# Patient Record
Sex: FEMALE | Race: WHITE | NOT HISPANIC OR LATINO | URBAN - METROPOLITAN AREA
[De-identification: names, ages, dates, MRNs, and addresses within clinical notes are randomized per-mention and may not be internally consistent; named-entity substitution may affect disease eponyms.]

---

## 2024-02-22 ENCOUNTER — TELEPHONE (OUTPATIENT)
Dept: PRIMARY CARE | Facility: CLINIC | Age: 54
End: 2024-02-22
Payer: COMMERCIAL

## 2024-02-22 DIAGNOSIS — E66.01 MORBID OBESITY WITH BMI OF 40.0-44.9, ADULT (MULTI): ICD-10-CM

## 2024-02-22 DIAGNOSIS — Z00.00 ENCOUNTER FOR HEALTH MAINTENANCE EXAMINATION IN ADULT: Primary | ICD-10-CM

## 2024-02-22 NOTE — TELEPHONE ENCOUNTER
PATIENT IS COMING IN 3.27.24 FOR PHYSICAL WOULD LIKE LABS IN THOSE LABS SHE WOULD LIKE ALSO THYROID CHECKED AND HORMONE  LEVELS CHECKED

## 2024-02-23 NOTE — TELEPHONE ENCOUNTER
Fasting labs ordered including thyroid test.  I do not routinely check other hormone levels. If she is thinking about female hormone, she can talk to her gyn provider. Thanks

## 2024-02-26 ENCOUNTER — TELEPHONE (OUTPATIENT)
Dept: OBSTETRICS AND GYNECOLOGY | Facility: CLINIC | Age: 54
End: 2024-02-26

## 2024-02-26 ENCOUNTER — HOSPITAL ENCOUNTER (OUTPATIENT)
Dept: RADIOLOGY | Facility: CLINIC | Age: 54
Discharge: HOME | End: 2024-02-26
Payer: COMMERCIAL

## 2024-02-26 ENCOUNTER — LAB (OUTPATIENT)
Dept: LAB | Facility: LAB | Age: 54
End: 2024-02-26
Payer: COMMERCIAL

## 2024-02-26 DIAGNOSIS — D25.1 INTRAMURAL AND SUBMUCOUS LEIOMYOMA OF UTERUS: ICD-10-CM

## 2024-02-26 DIAGNOSIS — Z00.00 ENCOUNTER FOR HEALTH MAINTENANCE EXAMINATION IN ADULT: ICD-10-CM

## 2024-02-26 DIAGNOSIS — Z12.31 SCREENING MAMMOGRAM FOR BREAST CANCER: ICD-10-CM

## 2024-02-26 DIAGNOSIS — N95.1 PERIMENOPAUSE: Primary | ICD-10-CM

## 2024-02-26 DIAGNOSIS — E66.01 MORBID OBESITY WITH BMI OF 40.0-44.9, ADULT (MULTI): ICD-10-CM

## 2024-02-26 DIAGNOSIS — D25.0 INTRAMURAL AND SUBMUCOUS LEIOMYOMA OF UTERUS: ICD-10-CM

## 2024-02-26 PROBLEM — B34.9 VIRAL INFECTION: Status: ACTIVE | Noted: 2024-02-26

## 2024-02-26 PROBLEM — J32.9 SINUSITIS: Status: ACTIVE | Noted: 2024-02-26

## 2024-02-26 PROBLEM — B36.9 CUTANEOUS FUNGAL INFECTION: Status: ACTIVE | Noted: 2024-02-26

## 2024-02-26 PROBLEM — N89.8 VAGINAL IRRITATION: Status: ACTIVE | Noted: 2024-02-26

## 2024-02-26 PROBLEM — R92.8 ABNORMAL MAMMOGRAPHY: Status: ACTIVE | Noted: 2024-02-26

## 2024-02-26 PROBLEM — M25.561 RIGHT KNEE PAIN: Status: ACTIVE | Noted: 2024-02-26

## 2024-02-26 PROBLEM — R09.89 PULMONARY CONGESTION: Status: ACTIVE | Noted: 2024-02-26

## 2024-02-26 PROBLEM — S09.90XA HEAD INJURY: Status: ACTIVE | Noted: 2024-02-26

## 2024-02-26 PROBLEM — J20.9 ACUTE BRONCHITIS: Status: ACTIVE | Noted: 2024-02-26

## 2024-02-26 PROBLEM — R05.3 PERSISTENT COUGH: Status: ACTIVE | Noted: 2024-02-26

## 2024-02-26 PROBLEM — J02.9 SORE THROAT: Status: ACTIVE | Noted: 2024-02-26
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
## 2024-02-26 PROBLEM — B36.9 FUNGAL INFECTION OF SKIN: Status: ACTIVE | Noted: 2024-02-26
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
## 2024-02-26 LAB
ALBUMIN SERPL BCP-MCNC: 4.4 G/DL (ref 3.4–5)
ALP SERPL-CCNC: 83 U/L (ref 33–110)
ALT SERPL W P-5'-P-CCNC: 22 U/L (ref 7–45)
ANION GAP SERPL CALC-SCNC: 16 MMOL/L (ref 10–20)
AST SERPL W P-5'-P-CCNC: 22 U/L (ref 9–39)
BILIRUB SERPL-MCNC: 0.4 MG/DL (ref 0–1.2)
BUN SERPL-MCNC: 12 MG/DL (ref 6–23)
CALCIUM SERPL-MCNC: 9 MG/DL (ref 8.6–10.3)
CHLORIDE SERPL-SCNC: 104 MMOL/L (ref 98–107)
CHOLEST SERPL-MCNC: 160 MG/DL (ref 0–199)
CHOLESTEROL/HDL RATIO: 3.5
CO2 SERPL-SCNC: 23 MMOL/L (ref 21–32)
CREAT SERPL-MCNC: 0.67 MG/DL (ref 0.5–1.05)
EGFRCR SERPLBLD CKD-EPI 2021: >90 ML/MIN/1.73M*2
ERYTHROCYTE [DISTWIDTH] IN BLOOD BY AUTOMATED COUNT: 14.4 % (ref 11.5–14.5)
EST. AVERAGE GLUCOSE BLD GHB EST-MCNC: 103 MG/DL
GLUCOSE SERPL-MCNC: 106 MG/DL (ref 74–99)
HBA1C MFR BLD: 5.2 %
HCT VFR BLD AUTO: 41.4 % (ref 36–46)
HDLC SERPL-MCNC: 45.5 MG/DL
HGB BLD-MCNC: 13.3 G/DL (ref 12–16)
LDLC SERPL CALC-MCNC: 89 MG/DL
MCH RBC QN AUTO: 29.4 PG (ref 26–34)
MCHC RBC AUTO-ENTMCNC: 32.1 G/DL (ref 32–36)
MCV RBC AUTO: 92 FL (ref 80–100)
NON HDL CHOLESTEROL: 115 MG/DL (ref 0–149)
NRBC BLD-RTO: 0 /100 WBCS (ref 0–0)
PLATELET # BLD AUTO: 303 X10*3/UL (ref 150–450)
POTASSIUM SERPL-SCNC: 3.7 MMOL/L (ref 3.5–5.3)
PROT SERPL-MCNC: 6.8 G/DL (ref 6.4–8.2)
RBC # BLD AUTO: 4.52 X10*6/UL (ref 4–5.2)
SODIUM SERPL-SCNC: 139 MMOL/L (ref 136–145)
T4 FREE SERPL-MCNC: 0.75 NG/DL (ref 0.61–1.12)
TRIGL SERPL-MCNC: 128 MG/DL (ref 0–149)
TSH SERPL-ACNC: 5.35 MIU/L (ref 0.44–3.98)
VLDL: 26 MG/DL (ref 0–40)
WBC # BLD AUTO: 7.4 X10*3/UL (ref 4.4–11.3)

## 2024-02-26 PROCEDURE — 84443 ASSAY THYROID STIM HORMONE: CPT

## 2024-02-26 PROCEDURE — 77067 SCR MAMMO BI INCL CAD: CPT | Performed by: RADIOLOGY

## 2024-02-26 PROCEDURE — 83036 HEMOGLOBIN GLYCOSYLATED A1C: CPT

## 2024-02-26 PROCEDURE — 84439 ASSAY OF FREE THYROXINE: CPT

## 2024-02-26 PROCEDURE — 85027 COMPLETE CBC AUTOMATED: CPT

## 2024-02-26 PROCEDURE — 77063 BREAST TOMOSYNTHESIS BI: CPT

## 2024-02-26 PROCEDURE — 80053 COMPREHEN METABOLIC PANEL: CPT

## 2024-02-26 PROCEDURE — 36415 COLL VENOUS BLD VENIPUNCTURE: CPT

## 2024-02-26 PROCEDURE — 80061 LIPID PANEL: CPT

## 2024-02-26 PROCEDURE — 77063 BREAST TOMOSYNTHESIS BI: CPT | Performed by: RADIOLOGY

## 2024-02-26 RX ORDER — PREDNISONE 20 MG/1
TABLET ORAL
COMMUNITY
Start: 2018-04-09 | End: 2024-04-17 | Stop reason: WASHOUT

## 2024-02-26 RX ORDER — NYSTATIN 100000 U/G
CREAM TOPICAL
COMMUNITY
Start: 2022-08-23 | End: 2024-04-17 | Stop reason: WASHOUT

## 2024-02-26 RX ORDER — MULTIVITAMIN
TABLET ORAL
COMMUNITY
Start: 2018-04-09

## 2024-02-26 NOTE — TELEPHONE ENCOUNTER
She is wondering if this one time you could do this when she was there they stuck her 5 times and could get she was just hoping while doing your labs she could try again

## 2024-02-26 NOTE — TELEPHONE ENCOUNTER
PT CALLED INQUIRING IF YOU COULD ADD A MENOPAUSE SCREENING ON HER LABS THAT SHE DONE. SHE STATES THAT SHE IS A HARD STICK AND IF YOU COULD ADD THAT ORDER TO THE LABS THAT WERE DRAWN TODAY

## 2024-03-07 ENCOUNTER — HOSPITAL ENCOUNTER (OUTPATIENT)
Dept: RADIOLOGY | Facility: HOSPITAL | Age: 54
Discharge: HOME | End: 2024-03-07
Payer: COMMERCIAL

## 2024-03-07 DIAGNOSIS — N95.1 PERIMENOPAUSE: ICD-10-CM

## 2024-03-07 PROCEDURE — 76856 US EXAM PELVIC COMPLETE: CPT

## 2024-03-07 PROCEDURE — 76857 US EXAM PELVIC LIMITED: CPT | Performed by: RADIOLOGY

## 2024-03-07 PROCEDURE — 76830 TRANSVAGINAL US NON-OB: CPT | Performed by: RADIOLOGY

## 2024-03-14 ENCOUNTER — OFFICE VISIT (OUTPATIENT)
Dept: OBSTETRICS AND GYNECOLOGY | Facility: CLINIC | Age: 54
End: 2024-03-14
Payer: COMMERCIAL

## 2024-03-14 ENCOUNTER — PREP FOR PROCEDURE (OUTPATIENT)
Dept: OBSTETRICS AND GYNECOLOGY | Facility: CLINIC | Age: 54
End: 2024-03-14

## 2024-03-14 VITALS
BODY MASS INDEX: 50.02 KG/M2 | DIASTOLIC BLOOD PRESSURE: 80 MMHG | HEIGHT: 64 IN | SYSTOLIC BLOOD PRESSURE: 122 MMHG | WEIGHT: 293 LBS

## 2024-03-14 DIAGNOSIS — N93.9 ABNORMAL UTERINE BLEEDING: Primary | ICD-10-CM

## 2024-03-14 DIAGNOSIS — D25.9 UTERINE LEIOMYOMA, UNSPECIFIED LOCATION: ICD-10-CM

## 2024-03-14 DIAGNOSIS — Z01.818 PREOPERATIVE EXAM FOR GYNECOLOGIC SURGERY: Primary | ICD-10-CM

## 2024-03-14 DIAGNOSIS — Z01.818 PREOPERATIVE EXAM FOR GYNECOLOGIC SURGERY: ICD-10-CM

## 2024-03-14 PROCEDURE — 3008F BODY MASS INDEX DOCD: CPT | Performed by: OBSTETRICS & GYNECOLOGY

## 2024-03-14 PROCEDURE — 1036F TOBACCO NON-USER: CPT | Performed by: OBSTETRICS & GYNECOLOGY

## 2024-03-14 PROCEDURE — 99214 OFFICE O/P EST MOD 30 MIN: CPT | Performed by: OBSTETRICS & GYNECOLOGY

## 2024-03-14 ASSESSMENT — PAIN SCALES - GENERAL: PAINLEVEL: 0-NO PAIN

## 2024-03-14 NOTE — PROGRESS NOTES
Subjective   Patient ID: Bobbi Valentin is a 53 y.o. female who presents for Fibroids (Discuss treatment ).  Patient referred by nurse practitioner for evaluation of abnormal uterine bleeding and uterine fibroids.  Patient notes that for the last year she has been having sporadic menses occasionally lasting up to 40 days.  Bleeding is heavy and light.  Soils himself at work sometimes.  Denies nausea vomiting fever chills no GI or  complaints.  Chart review reviewed along with ultrasound.  Films reviewed.  Patient has seven 8 cm of fundal fibroid unable to determine lining.  Patient's hormonal status uncertain.  Uncertain whether she is pre or postmenopausal.    Review of Systems  Abnormal uterine bleeding all other systems negative    Objective   Physical Exam  Constitutional:       Appearance: Normal appearance. She is obese.   Cardiovascular:      Rate and Rhythm: Normal rate and regular rhythm.   Pulmonary:      Effort: Pulmonary effort is normal.      Breath sounds: Normal breath sounds.   Abdominal:      General: Bowel sounds are normal. There is no distension.      Palpations: Abdomen is soft. There is no mass.      Tenderness: There is no abdominal tenderness.   Genitourinary:     Comments: External genitalia unremarkable  Vagina clear  Cervix closed uterus difficult to palpate seems prominent.  No adnexal masses  Perineum without lesions or swelling  Neurological:      General: No focal deficit present.      Mental Status: She is alert.   Psychiatric:         Mood and Affect: Mood normal.         Behavior: Behavior normal.         Thought Content: Thought content normal.         Judgment: Judgment normal.     Assessment/Plan   Diagnoses and all orders for this visit:  Abnormal uterine bleeding  -     FSH & LH; Future  Uterine leiomyoma, unspecified location  Patient with 1 year history of irregular vaginal bleeding.  Uncertain whether pre or postmenopausal at this point.  Chart reviewed.  Ultrasound  films reviewed fundal fibroid noted.  Reviewed options risks and benefits with patient.  Currently has irregular bleeding with unknown hormonal status.  Will order LH FSH.  Will schedule for diagnostic hysteroscopy D&C as endometrial sampling not possible in office.  Risks and benefits of procedure reviewed.       Kiran Cope MD 03/14/24 2:30 PM

## 2024-03-15 ENCOUNTER — HOSPITAL ENCOUNTER (OUTPATIENT)
Facility: HOSPITAL | Age: 54
Setting detail: OUTPATIENT SURGERY
End: 2024-03-15
Attending: OBSTETRICS & GYNECOLOGY | Admitting: OBSTETRICS & GYNECOLOGY
Payer: COMMERCIAL

## 2024-03-15 ENCOUNTER — TELEPHONE (OUTPATIENT)
Dept: OBSTETRICS AND GYNECOLOGY | Facility: CLINIC | Age: 54
End: 2024-03-15
Payer: COMMERCIAL

## 2024-03-15 DIAGNOSIS — Z01.818 PREOPERATIVE EXAM FOR GYNECOLOGIC SURGERY: Primary | ICD-10-CM

## 2024-03-27 ENCOUNTER — OFFICE VISIT (OUTPATIENT)
Dept: PRIMARY CARE | Facility: CLINIC | Age: 54
End: 2024-03-27
Payer: COMMERCIAL

## 2024-03-27 VITALS
HEART RATE: 83 BPM | SYSTOLIC BLOOD PRESSURE: 128 MMHG | WEIGHT: 293 LBS | DIASTOLIC BLOOD PRESSURE: 84 MMHG | TEMPERATURE: 98.1 F | RESPIRATION RATE: 18 BRPM | OXYGEN SATURATION: 97 % | HEIGHT: 64 IN | BODY MASS INDEX: 50.02 KG/M2

## 2024-03-27 DIAGNOSIS — G47.33 OSA (OBSTRUCTIVE SLEEP APNEA): ICD-10-CM

## 2024-03-27 DIAGNOSIS — E66.01 MORBID OBESITY WITH BODY MASS INDEX OF 50.0-59.9 IN ADULT (MULTI): ICD-10-CM

## 2024-03-27 DIAGNOSIS — E03.9 HYPOTHYROIDISM, UNSPECIFIED TYPE: ICD-10-CM

## 2024-03-27 DIAGNOSIS — Z00.00 PHYSICAL EXAM: Primary | ICD-10-CM

## 2024-03-27 PROCEDURE — 1036F TOBACCO NON-USER: CPT | Performed by: INTERNAL MEDICINE

## 2024-03-27 PROCEDURE — 99396 PREV VISIT EST AGE 40-64: CPT | Performed by: INTERNAL MEDICINE

## 2024-03-27 PROCEDURE — 3008F BODY MASS INDEX DOCD: CPT | Performed by: INTERNAL MEDICINE

## 2024-03-27 RX ORDER — LEVOTHYROXINE SODIUM 175 UG/1
175 TABLET ORAL DAILY
Qty: 90 TABLET | Refills: 3 | Status: SHIPPED | OUTPATIENT
Start: 2024-03-27 | End: 2024-05-28 | Stop reason: SDUPTHER

## 2024-03-27 NOTE — PROGRESS NOTES
"SUBJECTIVE:   Bobbi Valentin is a 53 y.o. female presenting for her annual physical/wellness.  PCP: Yifan Murray MD  Physical, and discuss obesity TX.  She is to get D+C next week.  Also has a fibroid.  Heavy periods.    Regarding to weight, she is trying to do intermittent dieting at this time.   Has tried various diet, without losing weight. Or gained it back. Despite left knee bothers her, she does try to walk regularly   said she snores and catches her breath during sleep, wakes her up frequently and not feeling rested in am.      Colon screening:  Up to date with Colonoscopy   Last pap: sees gyn  Last mammogram: Up to date   Dexa na  Vaccines  Up to date      Diet:   healthy in general  Exercises:  regularly  Lab Results   Component Value Date    HGBA1C 5.2 02/26/2024     Lab Results   Component Value Date    CREATININE 0.67 02/26/2024     Lab Results   Component Value Date    WBC 7.4 02/26/2024    HGB 13.3 02/26/2024    HCT 41.4 02/26/2024    MCV 92 02/26/2024     02/26/2024     Lab Results   Component Value Date    CHOL 160 02/26/2024    CHOL 175 05/04/2022     Lab Results   Component Value Date    HDL 45.5 02/26/2024    HDL 42.9 05/04/2022     Lab Results   Component Value Date    LDLCALC 89 02/26/2024     Lab Results   Component Value Date    TRIG 128 02/26/2024    TRIG 175 (H) 05/04/2022     No components found for: \"CHOLHDL\"       ROS:   Feeling well. No dyspnea or chest pain on exertion. No abdominal pain, change in bowel habits, black or bloody stools. No urinary tract or  symptoms.  No breast concerns. No neurological complaints.    OBJECTIVE:   The patient appears well, alert, oriented x 3, in no distress.   /84   Pulse 83   Temp 36.7 °C (98.1 °F)   Resp 18   Ht 1.626 m (5' 4\")   Wt 136 kg (299 lb)   LMP 07/25/2022   SpO2 97%   BMI 51.32 kg/m²   ENT normal.  Neck supple. No adenopathy or thyromegaly. DAFNE. Lungs are clear, good air entry, no wheezes, rhonchi or rales. " S1 and S2 normal, no murmurs, regular rate and rhythm. Abdomen is soft without tenderness, guarding, mass or organomegaly.  exam deferred to Gyn. Extremities show no edema, normal peripheral pulses. Neurological is normal without focal findings.      1. Physical exam        2. Morbid obesity with body mass index of 50.0-59.9 in adult (CMS/[facility])  Follow Up In [facility] - Pharmacy      3. Hypothyroidism, unspecified type  levothyroxine (Synthroid, Levoxyl) 175 mcg tablet, TSH with reflex to Free T4 if abnormal      4. MARIS (obstructive sleep apnea)  Referral to Adult Sleep Medicine      Refer to  pharmacy to help treat obesity  TSH is high, will start low dose of thyroid supplement  Check TSH in 3 months

## 2024-04-02 ENCOUNTER — APPOINTMENT (OUTPATIENT)
Dept: OBSTETRICS AND GYNECOLOGY | Facility: CLINIC | Age: 54
End: 2024-04-02
Payer: COMMERCIAL

## 2024-04-09 ENCOUNTER — APPOINTMENT (OUTPATIENT)
Dept: OBSTETRICS AND GYNECOLOGY | Facility: CLINIC | Age: 54
End: 2024-04-09
Payer: COMMERCIAL

## 2024-04-12 ENCOUNTER — TELEMEDICINE (OUTPATIENT)
Dept: PHARMACY | Facility: HOSPITAL | Age: 54
End: 2024-04-12
Payer: COMMERCIAL

## 2024-04-12 DIAGNOSIS — E66.01 MORBID OBESITY WITH BODY MASS INDEX OF 50.0-59.9 IN ADULT (MULTI): ICD-10-CM

## 2024-04-12 PROCEDURE — RXMED WILLOW AMBULATORY MEDICATION CHARGE

## 2024-04-12 NOTE — PROGRESS NOTES
Patient ID: Bobbi Valentin is a 53 y.o. female who presents for Obesity.    Referring Provider: Yifan Murray MD  PCP: Yifan Murray MD Last visit with PCP: 3/27/24 Next visit with PCP: Not currently scheduled      Subjective     Pertinent Past Medical History  Denies history of pancreatitis  LDL at goal  Denies history of medullary thyroid cancer     DICUSSION  Patient has not tried any medications in the past for weight loss  She has been utilizing intermittent fasting for the past year which she found useful in the beginning but lately her weight has plateau-ed  Patient does go on the elliptical for 30 minutes 4 times a week and will walk longer during the summer outdoors  Counseled on GLP1 options for weight loss: Wegovy and Zepbound  Counseled on MOA, side effects, administration, storage, dosing schedule  Patient is  Employee health plan which does not cover GLP1 for weight loss however  coupon dose exist for Zepbound that reduces out of pocket cost to $550/month  Patient is agreeable to trying medication and would like it mailed from  Pharmacy  Made patient aware of current supply issues with medications     Objective     LMP 07/25/2022    BP Readings from Last 4 Encounters:   03/27/24 128/84   03/14/24 122/80   08/23/22 120/82   06/08/22 118/80      There were no vitals filed for this visit.     Labs  Lab Results   Component Value Date    BILITOT 0.4 02/26/2024    CALCIUM 9.0 02/26/2024    CO2 23 02/26/2024     02/26/2024    CREATININE 0.67 02/26/2024    GLUCOSE 106 (H) 02/26/2024    ALKPHOS 83 02/26/2024    K 3.7 02/26/2024    PROT 6.8 02/26/2024     02/26/2024    AST 22 02/26/2024    ALT 22 02/26/2024    BUN 12 02/26/2024    ANIONGAP 16 02/26/2024    ALBUMIN 4.4 02/26/2024    GFRF >90 05/04/2022     Lab Results   Component Value Date    TRIG 128 02/26/2024    CHOL 160 02/26/2024    LDLCALC 89 02/26/2024    HDL 45.5 02/26/2024     Lab Results   Component Value Date     HGBA1C 5.2 02/26/2024       Current Outpatient Medications   Medication Instructions    levothyroxine (SYNTHROID, LEVOXYL) 175 mcg, oral, Daily    multivitamin (Daily Multi-Vitamin) tablet oral, Daily RT    nystatin (Mycostatin) cream Topical, Apply to affected areas 2-3 times daily    predniSONE (Deltasone) 20 mg tablet oral         Drug Interactions;  None at time of review    Assessment/Plan   Prosper Loss Plan  START Zepbound 2.5 mg under the skin once weekly  Prescription sent to [facility] for delivery       Follow-up: 1 month to assess tolerability      Time spent with pt: Total length of time 25 (minutes) of the encounter and more than 50% was spent counseling the patient.    Atiya Dugan, PharmD    Continue all meds under the continuation of care with the referring provider and clinical pharmacy team.

## 2024-04-17 ENCOUNTER — TELEMEDICINE CLINICAL SUPPORT (OUTPATIENT)
Dept: PREADMISSION TESTING | Facility: HOSPITAL | Age: 54
End: 2024-04-17
Payer: COMMERCIAL

## 2024-04-17 DIAGNOSIS — Z01.818 PREOPERATIVE EXAM FOR GYNECOLOGIC SURGERY: ICD-10-CM

## 2024-04-17 RX ORDER — IBUPROFEN 400 MG/1
400 TABLET ORAL EVERY 6 HOURS PRN
COMMUNITY
End: 2024-04-22 | Stop reason: ALTCHOICE

## 2024-04-18 ENCOUNTER — PHARMACY VISIT (OUTPATIENT)
Dept: PHARMACY | Facility: CLINIC | Age: 54
End: 2024-04-18
Payer: COMMERCIAL

## 2024-04-22 ENCOUNTER — PREP FOR PROCEDURE (OUTPATIENT)
Dept: OBSTETRICS AND GYNECOLOGY | Facility: CLINIC | Age: 54
End: 2024-04-22

## 2024-04-22 ENCOUNTER — OFFICE VISIT (OUTPATIENT)
Dept: OBSTETRICS AND GYNECOLOGY | Facility: CLINIC | Age: 54
End: 2024-04-22
Payer: COMMERCIAL

## 2024-04-22 ENCOUNTER — APPOINTMENT (OUTPATIENT)
Dept: OBSTETRICS AND GYNECOLOGY | Facility: CLINIC | Age: 54
End: 2024-04-22
Payer: COMMERCIAL

## 2024-04-22 ENCOUNTER — TELEMEDICINE (OUTPATIENT)
Dept: PHARMACY | Facility: HOSPITAL | Age: 54
End: 2024-04-22
Payer: COMMERCIAL

## 2024-04-22 ENCOUNTER — HOSPITAL ENCOUNTER (OUTPATIENT)
Facility: HOSPITAL | Age: 54
Setting detail: OUTPATIENT SURGERY
End: 2024-04-22
Attending: OBSTETRICS & GYNECOLOGY | Admitting: OBSTETRICS & GYNECOLOGY
Payer: COMMERCIAL

## 2024-04-22 VITALS
BODY MASS INDEX: 50.02 KG/M2 | WEIGHT: 293 LBS | DIASTOLIC BLOOD PRESSURE: 74 MMHG | HEIGHT: 64 IN | SYSTOLIC BLOOD PRESSURE: 127 MMHG

## 2024-04-22 DIAGNOSIS — N92.1 MENORRHAGIA WITH IRREGULAR CYCLE: Primary | ICD-10-CM

## 2024-04-22 DIAGNOSIS — D21.9 FIBROID: ICD-10-CM

## 2024-04-22 DIAGNOSIS — E66.01 MORBID OBESITY WITH BODY MASS INDEX OF 50.0-59.9 IN ADULT (MULTI): ICD-10-CM

## 2024-04-22 DIAGNOSIS — N88.2 CERVICAL STENOSIS (UTERINE CERVIX): ICD-10-CM

## 2024-04-22 PROCEDURE — 99204 OFFICE O/P NEW MOD 45 MIN: CPT | Performed by: OBSTETRICS & GYNECOLOGY

## 2024-04-22 PROCEDURE — 3008F BODY MASS INDEX DOCD: CPT | Performed by: OBSTETRICS & GYNECOLOGY

## 2024-04-22 PROCEDURE — 1036F TOBACCO NON-USER: CPT | Performed by: OBSTETRICS & GYNECOLOGY

## 2024-04-22 RX ORDER — MISOPROSTOL 200 UG/1
TABLET ORAL
Qty: 2 TABLET | Refills: 0 | Status: SHIPPED | OUTPATIENT
Start: 2024-04-22 | End: 2024-05-09 | Stop reason: SDUPTHER

## 2024-04-22 RX ORDER — IBUPROFEN 600 MG/1
TABLET ORAL
Qty: 2 TABLET | Refills: 0 | Status: SHIPPED | OUTPATIENT
Start: 2024-04-22 | End: 2024-05-09 | Stop reason: SDUPTHER

## 2024-04-22 RX ORDER — SODIUM CHLORIDE, SODIUM LACTATE, POTASSIUM CHLORIDE, CALCIUM CHLORIDE 600; 310; 30; 20 MG/100ML; MG/100ML; MG/100ML; MG/100ML
100 INJECTION, SOLUTION INTRAVENOUS CONTINUOUS
OUTPATIENT
Start: 2024-04-22

## 2024-04-22 NOTE — PROGRESS NOTES
"Subjective   Patient ID: Bobbi Valentin is a 53 y.o. female who presents for New Patient Visit (Uterine Fibroids , Urinary incontinence ).    Patient presents today as a second opinion regarding her fibroids    Cycles are not every month.  Is skipping some cycles  Flow last for 2 to 3 weeks.  Heavy flow with clots. Using pads with tampons together.    Ultrasound demonstrates a large intracavitary uterine fibroid  Is 5 x 7 cm in size    Blood work from February shows she is not anemic and her thyroid testing is normal      ROS  All Normal Review of Systems  Constitutional: no fever, no chills, no recent weight gain, no recent weight loss and no fatigue.    Gastrointestinal: no abdominal pain, no constipation, no nausea, no diarrhea and no vomiting.    Genitourinary: no dysuria, no urinary incontinence, no vaginal dryness, no vaginal itching, no dyspareunia, no pelvic pain, no dysmenorrhea, no sexual problems, no change in urinary frequency, no vaginal discharge, no unexplained vaginal bleeding and no lesion/sore.    Breasts: No masses.  No nipple discharge.  No redness    Objective   /74   Ht 1.626 m (5' 4\")   Wt 137 kg (302 lb)   LMP 07/25/2022   BMI 51.84 kg/m²    Physical Exam  General: No distress.  Alert and oriented  Abdomen: Soft, nontender, nondistended  External Genitalia:  no masses.  no erythema.  no discharge noted.  Vagina:  no masses.  no discharge noted.    Cervix: no masses.    Uterus:  normal size and contour.  no masses. palpated  Adnexa: R: no masses, non tender.    Adnexa: L: no masses.  non tender  Extremities: No swelling  Psych: No sadness.      Imaging:  FINDINGS:  Urinary bladder was nearly empty and collapsed. This limited the  transabdominal portion of the exam.          UTERUS:  The uterus measures  5.9 x 6.2 x 10.8  .  There was a large mass  dominating the corpus and fundus of the uterus measuring 7.1 x 5.6 x  5.8 cm..      ENDOMETRIUM:  The endometrium was completely " obscured by the large uterine mass.      RIGHT ADNEXA:  Unable to identify the right ovary. No gross right adnexal mass.      LEFT ADNEXA:  Unable to identify the left ovary. No gross left adnexal mass.      CUL DE SAC:  No gross free fluid is seen in the pelvic cul-de-sac.    Assessment/Plan   1) menorrhagia fibroids  Reviewed ultrasound with patient.  Blood count thyroid testing normal  Normal exam today  Options reviewed with patient.  Will need to evaluate the lining of the uterus prior.  Offered surgical versus office procedure.  Will try in the office.  I have sent medication to your pharmacy so you can tolerate the office procedure.  Please take misoprostol with Motrin the night prior to the procedure.  2 hours prior to the procedure take another Motrin with food  Will evaluate the lining of the uterus.  We discussed surgical options moving forward.  Options include doing nothing, a hysteroscopic myomectomy or hysterectomy.  We discussed with the fibroid the size hysteroscopic myomectomy may take a few attempts to remove it completely.  You like to move forward with a hysterectomy.  We have you scheduled for August 7.  Please follow-up for your office procedure and then a preop visit and we will look forward to seeing you.  Thank you      Thank you for your visit to our office today.

## 2024-04-22 NOTE — PROGRESS NOTES
Patient ID: Bobbi Valentin is a 53 y.o. female who presents for Obesity.  At last visit, prescription for Zepbound was sent for delivery.     Referring Provider: Yifan Murray MD  PCP: Yifan Murray MD Last visit with PCP: 3/27/24 Next visit with PCP: Not currently scheduled      Subjective     DISCUSSION  Patient received first delivery of Zepbound 2.5 mg this past Friday  Patient has not yet started. She has appointment with OBGYN and wants to receive approval from specialist   Patient has no questions regarding medications  Plans to start this Friday     Objective     LMP 07/25/2022    BP Readings from Last 4 Encounters:   04/22/24 127/74   03/27/24 128/84   03/14/24 122/80   08/23/22 120/82      There were no vitals filed for this visit.     Labs  Lab Results   Component Value Date    BILITOT 0.4 02/26/2024    CALCIUM 9.0 02/26/2024    CO2 23 02/26/2024     02/26/2024    CREATININE 0.67 02/26/2024    GLUCOSE 106 (H) 02/26/2024    ALKPHOS 83 02/26/2024    K 3.7 02/26/2024    PROT 6.8 02/26/2024     02/26/2024    AST 22 02/26/2024    ALT 22 02/26/2024    BUN 12 02/26/2024    ANIONGAP 16 02/26/2024    ALBUMIN 4.4 02/26/2024    GFRF >90 05/04/2022     Lab Results   Component Value Date    TRIG 128 02/26/2024    CHOL 160 02/26/2024    LDLCALC 89 02/26/2024    HDL 45.5 02/26/2024     Lab Results   Component Value Date    HGBA1C 5.2 02/26/2024       Current Outpatient Medications   Medication Instructions    ibuprofen 600 mg tablet Please take 1 tablet with food the night prior to the procedure and then again 2 hours prior to the procedure with food    levothyroxine (SYNTHROID, LEVOXYL) 175 mcg, oral, Daily    miSOPROStoL (Cytotec) 200 mcg tablet Please take 2 pills the night prior to the procedure    multivitamin (Daily Multi-Vitamin) tablet oral, Daily RT    Zepbound 2.5 mg, subcutaneous, Every 7 days         Drug Interactions;  None at time of review    Assessment/Plan   START Zepbound 2.5 mg  under the skin once weekly      Follow-up: 5/17 to assess tolerability to Zepbound 2.5 mg      Time spent with pt: Total length of time 10 (minutes) of the encounter and more than 50% was spent counseling the patient.    Atiya Dugan, PharmD    Continue all meds under the continuation of care with the referring provider and clinical pharmacy team.

## 2024-04-23 ENCOUNTER — TELEPHONE (OUTPATIENT)
Dept: PREADMISSION TESTING | Facility: HOSPITAL | Age: 54
End: 2024-04-23

## 2024-04-23 ENCOUNTER — APPOINTMENT (OUTPATIENT)
Dept: OBSTETRICS AND GYNECOLOGY | Facility: CLINIC | Age: 54
End: 2024-04-23
Payer: COMMERCIAL

## 2024-04-24 ENCOUNTER — APPOINTMENT (OUTPATIENT)
Dept: PREADMISSION TESTING | Facility: HOSPITAL | Age: 54
End: 2024-04-24
Payer: COMMERCIAL

## 2024-04-26 ENCOUNTER — TELEPHONE (OUTPATIENT)
Dept: OBSTETRICS AND GYNECOLOGY | Facility: CLINIC | Age: 54
End: 2024-04-26
Payer: COMMERCIAL

## 2024-04-26 ENCOUNTER — APPOINTMENT (OUTPATIENT)
Dept: OBSTETRICS AND GYNECOLOGY | Facility: HOSPITAL | Age: 54
End: 2024-04-26
Payer: COMMERCIAL

## 2024-04-26 NOTE — TELEPHONE ENCOUNTER
Returned patients call [facility]  . She wishes to cancel her procedure with  on 5/8/24. D&C Hysteroscopy. She is now under the care of Dr. Elise.

## 2024-05-09 ENCOUNTER — TELEPHONE (OUTPATIENT)
Dept: OBSTETRICS AND GYNECOLOGY | Facility: CLINIC | Age: 54
End: 2024-05-09

## 2024-05-09 ENCOUNTER — APPOINTMENT (OUTPATIENT)
Dept: OBSTETRICS AND GYNECOLOGY | Facility: CLINIC | Age: 54
End: 2024-05-09
Payer: COMMERCIAL

## 2024-05-09 DIAGNOSIS — N92.1 MENORRHAGIA WITH IRREGULAR CYCLE: ICD-10-CM

## 2024-05-09 DIAGNOSIS — N88.2 CERVICAL STENOSIS (UTERINE CERVIX): ICD-10-CM

## 2024-05-09 DIAGNOSIS — D21.9 FIBROID: ICD-10-CM

## 2024-05-09 RX ORDER — MISOPROSTOL 200 UG/1
TABLET ORAL
Qty: 2 TABLET | Refills: 0 | Status: SHIPPED | OUTPATIENT
Start: 2024-05-09

## 2024-05-09 RX ORDER — IBUPROFEN 600 MG/1
TABLET ORAL
Qty: 2 TABLET | Refills: 0 | Status: SHIPPED | OUTPATIENT
Start: 2024-05-09

## 2024-05-09 NOTE — PROGRESS NOTES
[facility]  [facility]  [facility]  [address] [place] [address]     [facility]  New Visit Note      Subjective   Patient ID: Bobbi Valentin is a 53 y.o. female with past medical history significant for Morbid Obesity, sinuitis, and Sleep disorder breathing.    5/10/2024: The patient is here alone today and was referred by PCP Yifan Murray MD, for comprehensive sleep medicine evaluation due to suspected sleep apnea, excessive daytime sleepiness/fatigue, and difficulty falling/staying asleep. She reported having COVID 2 years ago, and after that, she woke up more often at night. She also mentions that she is close to menopause and has uterine polyps, which makes her more anxious. She feels exhausted and doeszes during the day. Her  is aware that she is snoring and gasping at night sometimes. Today, she came here to establish care. Her  ESS is 10, ANASTACIO is 21, and her neck circumference is 18.75 inches today.    HPI  The patient had been having these symptoms for the past 2 years. The patient never had sleep study done yet.       SLEEP STUDY HISTORY: (personally reviewed raw data such as interpretation report, data sheet, hypnogram, and titration table if available and applicable)  N/A    SLEEP-WAKE SCHEDULE  Bedtime: 10 PM on weekdays, 10-11 PM on weekends  Subjective sleep latency: 30 minutes  Problems falling asleep: No  Number of awakenings: 2-3 times per night spontaneously for urination  Falls back asleep in 20 minutes  Problems staying asleep: Yes  Final wake time: 5 AM on weekdays, 5-8 AM on weekends  Shift work: No  Naps: Yes  Feels rested after a nap: No   Average sleep duration (excluding naps): 5-6 hours    SLEEP ENVIRONMENT  Sleep location: bed  Sleep status: sleeps with   Room is dark:  Yes  Room is quiet: Yes  Room is cool: Yes  Bed comfort: good    SLEEP HABITS:   Activities before  bedtime: use phone for reading  Activities in bed: no  Preferred sleep position: back    SLEEP ROS:  Night symptoms: POSITIVE for snoring, wake up gasping and/or choking for air, nasal congestion , mouth breathing, night sweats during sleep, heartburn or sour taste in mouth at night, nocturnal cough, and nocturia  Morning symptoms: POSITIVE for unrefreshing sleep and morning dry mouth  Daytime symptoms POSITIVE for excessive daytime sleepiness, fatigue, trouble remembering things in daytime, and trouble staying focused in daytime  Hypersomnia / narcolepsy symptoms: Patient denies symptoms of a hypersomnolence disorder such as sleep paralysis, sleep-related hallucinations, recurrent sleep attacks, automatic behaviors, and cataplexy.   RLS symptoms: Patient denies RLS symptoms.  Movements in sleep: Patient denies problematic movements in sleep such as seizures during sleep, frequent leg kicks / jerks while asleep, and sleep-related bruxism.  Parasomnia symptoms: Patient denies symptoms of parasomnia such as sleepwalking, sleeptalking, sleep-eating, acting out dreams, and nightmares.     WEIGHT: gained 20 lbs in 6 months     REVIEW OF SYSTEMS: All other systems have been reviewed and are negative.    PERTINENT SOCIAL HISTORY:  Occupation: perfusionist  Smoking: No   ETOH: No   Marijuana: No   Caffeine: Yes  Sleep aids: No   Claustrophobia: No     PERTINENT PAST SURGICAL HISTORY:  non-contributory    PERTINENT FAMILY HISTORY:  sleep apnea- father, brother, mother    Active Problems, Allergy List, Medication List, and PMH/PSH/FH/Social Hx have been reviewed and reconciled in chart. No significant changes unless documented in the pertinent chart section. Updates made when necessary.       Objective     Vitals:    05/10/24 1100   BP: 137/80   Pulse: 82   Resp: 18   SpO2: 96%      Physical Exam  Constitutional:alert and oriented to time, place, and person  Sinus: - tenderness to palpation  Palate:  Narrow Mallampati 3, -  Tongue scalloping, - macroglossia  Lungs: Clear to auscultation bilateral, no rales  Heart: Regular rate and rhythm, no murmurs    Assessment/Plan   Bobbi Valentin is a 53 y.o. female who is seen to evaluate for possible obstructive sleep apnea. The pathophysiology of sleep apnea, diagnostic testing (HST vs PSG), treatment options (PAP, oral appliance, surgery, hypoglossal nerve stimulator called Inspire), and supportive management (weight loss, positional therapy, smoking cessation, avoidance of alcohol and sedatives) were discussed with the patient in detail. Risk factors of sleep apnea as well as cardiometabolic and neurocognitive sequelae associated with untreated sleep apnea were also discussed. Lastly, patient was advised to avoid driving vehicle or operating heavy machinery when sleepy.     Bobbi Valentin with the following problems:     # SLEEP DISORDERED BREATHING:  -This is likely sleep apnea based on the the history and physical examination.   -Bobbi Shabazz not yet had a sleep study.  -Instruct patient to complete a split night sleep study with EtCO2 to evaluate  the risk hypoventilation.    -We consider treatment as indicated when testing is complete.     # CHRONIC SLEEP MAINTENANCE INSOMNIA:  -likely due to  untreated sleep apnea, and nocturia.  -ANASTACIO: 21    # MORBID OBESITY:  -with a BMI of 51.84. Bobbi Valentin most recent Bicarbonate was 23  Bicarbonate   Date Value Ref Range Status   02/26/2024 23 21 - 32 mmol/L Final   -Encourage to have regular exercise to manage weight well.  -Refer to a nutritionist for weight control.  -Bariatric surgery candidate.    # NASAL CONGESTION:  -Instruct Bobbi Singh use appropriate Flonase spray to ease congestion.  -Refer to Otolaryngology for underlying investigation.    # XEROSTOMIA:  -Instruct Bobbi Singh purchase the Biotene gel to ease the dry mouth symptom,       RTC 2-3 weeks after sleep study       All of patient's questions were  answered. She verbalizes understanding and agreement with my assessment and plan.

## 2024-05-09 NOTE — TELEPHONE ENCOUNTER
Bobbi Valentin took the medicine for the procedure that she was suppose to have today but had to cancel due to her menstrual starting and wants to know if it can be sent to the pharmacy again.      Last Office Visit: 4/22/2024  Next Office Visit: 5/20/2024  Med: miSOPROS/Blanca (Cytotec) 200 mcg tablet   Pharmacy: [facility]     SHAHIDA GARCIA MA

## 2024-05-10 ENCOUNTER — OFFICE VISIT (OUTPATIENT)
Dept: SLEEP MEDICINE | Facility: CLINIC | Age: 54
End: 2024-05-10
Payer: COMMERCIAL

## 2024-05-10 VITALS
OXYGEN SATURATION: 96 % | RESPIRATION RATE: 18 BRPM | HEIGHT: 64 IN | BODY MASS INDEX: 50.02 KG/M2 | SYSTOLIC BLOOD PRESSURE: 137 MMHG | WEIGHT: 293 LBS | HEART RATE: 82 BPM | DIASTOLIC BLOOD PRESSURE: 80 MMHG

## 2024-05-10 DIAGNOSIS — G47.30 SLEEP-RELATED BREATHING DISORDER: Primary | ICD-10-CM

## 2024-05-10 DIAGNOSIS — G47.33 OSA (OBSTRUCTIVE SLEEP APNEA): ICD-10-CM

## 2024-05-10 DIAGNOSIS — E66.01 MORBID OBESITY (MULTI): ICD-10-CM

## 2024-05-10 PROCEDURE — 1036F TOBACCO NON-USER: CPT

## 2024-05-10 PROCEDURE — 3008F BODY MASS INDEX DOCD: CPT

## 2024-05-10 PROCEDURE — 99204 OFFICE O/P NEW MOD 45 MIN: CPT

## 2024-05-10 PROCEDURE — 99214 OFFICE O/P EST MOD 30 MIN: CPT

## 2024-05-10 ASSESSMENT — PAIN SCALES - GENERAL: PAINLEVEL: 0-NO PAIN

## 2024-05-10 ASSESSMENT — PATIENT HEALTH QUESTIONNAIRE - PHQ9
1. LITTLE INTEREST OR PLEASURE IN DOING THINGS: NOT AT ALL
2. FEELING DOWN, DEPRESSED OR HOPELESS: NOT AT ALL
SUM OF ALL RESPONSES TO PHQ9 QUESTIONS 1 AND 2: 0

## 2024-05-10 ASSESSMENT — SLEEP AND FATIGUE QUESTIONNAIRES
WORRIED_DISTRESSED_DUE_TO_SLEEP: MUCH
HOW LIKELY ARE YOU TO NOD OFF OR FALL ASLEEP WHILE SITTING QUIETLY AFTER LUNCH WITHOUT ALCOHOL: HIGH CHANCE OF DOZING
SITING INACTIVE IN A PUBLIC PLACE LIKE A CLASS ROOM OR A MOVIE THEATER: WOULD NEVER DOZE
HOW LIKELY ARE YOU TO NOD OFF OR FALL ASLEEP WHILE SITTING AND READING: MODERATE CHANCE OF DOZING
HOW LIKELY ARE YOU TO NOD OFF OR FALL ASLEEP WHEN YOU ARE A PASSENGER IN A CAR FOR AN HOUR WITHOUT A BREAK: HIGH CHANCE OF DOZING
SATISFACTION_WITH_CURRENT_SLEEP_PATTERN: DISSATISFIED
HOW LIKELY ARE YOU TO NOD OFF OR FALL ASLEEP IN A CAR, WHILE STOPPED FOR A FEW MINUTES IN TRAFFIC: WOULD NEVER DOZE
HOW LIKELY ARE YOU TO NOD OFF OR FALL ASLEEP WHILE WATCHING TV: MODERATE CHANCE OF DOZING
ESS-CHAD TOTAL SCORE: 13
HOW LIKELY ARE YOU TO NOD OFF OR FALL ASLEEP WHILE LYING DOWN TO REST IN THE AFTERNOON WHEN CIRCUMSTANCES PERMIT: HIGH CHANCE OF DOZING
HOW LIKELY ARE YOU TO NOD OFF OR FALL ASLEEP WHILE SITTING AND TALKING TO SOMEONE: WOULD NEVER DOZE
SLEEP_PROBLEM_INTERFERES_DAILY_ACTIVITIES: SOMEWHAT
DIFFICULTY_STAYING_ASLEEP: VERY SEVERE
WAKING_TOO_EARLY: VERY SEVERE
SLEEP_PROBLEM_NOTICEABLE_TO_OTHERS: VERY MUCH NOTICEABLE

## 2024-05-10 NOTE — LETTER
May 10, 2024     Yifan Murray MD  [address]  [facility] [address]    Patient: Bobbi Valentin   YOB: 1970   Date of Visit: 5/10/2024       Dear Dr. Yifan Murray MD:    Thank you for referring Bobbi Valentin to me for evaluation. Below are my notes for this consultation.  If you have questions, please do not hesitate to call me. I look forward to following your patient along with you.       Sincerely,     Mayur Murray, APRN-CNP      CC: No Recipients  ______________________________________________________________________________________           [facility]  [facility] ONE  [facility] ONE  [address] [facility] [address]     [facility]  New Visit Note      Subjective  Patient ID: Bobbi Valentin is a 53 y.o. female with past medical history significant for Morbid Obesity, sinuitis, and Sleep disorder breathing.    5/10/2024: The patient is here alone today and was referred by PCP Yifan Murray MD, for comprehensive sleep medicine evaluation due to suspected sleep apnea, excessive daytime sleepiness/fatigue, and difficulty falling/staying asleep. She reported having COVID 2 years ago, and after that, she woke up more often at night. She also mentions that she is close to menopause and has uterine polyps, which makes her more anxious. She feels exhausted and doeszes during the day. Her  is aware that she is snoring and gasping at night sometimes. Today, she came here to establish care. Her  ESS is 10, ANASTACIO is 21, and her neck circumference is 18.75 inches today.    HPI  The patient had been having these symptoms for the past 2 years. The patient never had sleep study done yet.       SLEEP STUDY HISTORY: (personally reviewed raw data such as interpretation report, data sheet, hypnogram, and titration table if available and applicable)  N/A    SLEEP-WAKE  SCHEDULE  Bedtime: 10 PM on weekdays, 10-11 PM on weekends  Subjective sleep latency: 30 minutes  Problems falling asleep: No  Number of awakenings: 2-3 times per night spontaneously for urination  Falls back asleep in 20 minutes  Problems staying asleep: Yes  Final wake time: 5 AM on weekdays, 5-8 AM on weekends  Shift work: No  Naps: Yes  Feels rested after a nap: No   Average sleep duration (excluding naps): 5-6 hours    SLEEP ENVIRONMENT  Sleep location: bed  Sleep status: sleeps with   Room is dark:  Yes  Room is quiet: Yes  Room is cool: Yes  Bed comfort: good    SLEEP HABITS:   Activities before bedtime: use phone for reading  Activities in bed: no  Preferred sleep position: back    SLEEP ROS:  Night symptoms: POSITIVE for snoring, wake up gasping and/or choking for air, nasal congestion , mouth breathing, night sweats during sleep, heartburn or sour taste in mouth at night, nocturnal cough, and nocturia  Morning symptoms: POSITIVE for unrefreshing sleep and morning dry mouth  Daytime symptoms POSITIVE for excessive daytime sleepiness, fatigue, trouble remembering things in daytime, and trouble staying focused in daytime  Hypersomnia / narcolepsy symptoms: Patient denies symptoms of a hypersomnolence disorder such as sleep paralysis, sleep-related hallucinations, recurrent sleep attacks, automatic behaviors, and cataplexy.   RLS symptoms: Patient denies RLS symptoms.  Movements in sleep: Patient denies problematic movements in sleep such as seizures during sleep, frequent leg kicks / jerks while asleep, and sleep-related bruxism.  Parasomnia symptoms: Patient denies symptoms of parasomnia such as sleepwalking, sleeptalking, sleep-eating, acting out dreams, and nightmares.     WEIGHT: gained 20 lbs in 6 months     REVIEW OF SYSTEMS: All other systems have been reviewed and are negative.    PERTINENT SOCIAL HISTORY:  Occupation: perfusionist  Smoking: No   ETOH: No   Marijuana: No   Caffeine: Yes  Sleep  aids: No   Claustrophobia: No     PERTINENT PAST SURGICAL HISTORY:  non-contributory    PERTINENT FAMILY HISTORY:  sleep apnea- father, brother, mother    Active Problems, Allergy List, Medication List, and PMH/PSH/FH/Social Hx have been reviewed and reconciled in chart. No significant changes unless documented in the pertinent chart section. Updates made when necessary.       Objective    Vitals:    05/10/24 1100   BP: 137/80   Pulse: 82   Resp: 18   SpO2: 96%      Physical Exam  Constitutional:alert and oriented to time, place, and person  Sinus: - tenderness to palpation  Palate:  Narrow Mallampati 3, - Tongue scalloping, - macroglossia  Lungs: Clear to auscultation bilateral, no rales  Heart: Regular rate and rhythm, no murmurs    Assessment/Plan  Bobbi Valentin is a 53 y.o. female who is seen to evaluate for possible obstructive sleep apnea. The pathophysiology of sleep apnea, diagnostic testing (HST vs PSG), treatment options (PAP, oral appliance, surgery, hypoglossal nerve stimulator called Inspire), and supportive management (weight loss, positional therapy, smoking cessation, avoidance of alcohol and sedatives) were discussed with the patient in detail. Risk factors of sleep apnea as well as cardiometabolic and neurocognitive sequelae associated with untreated sleep apnea were also discussed. Lastly, patient was advised to avoid driving vehicle or operating heavy machinery when sleepy.     Bobbi Valentin with the following problems:     # SLEEP DISORDERED BREATHING:  -This is likely sleep apnea based on the the history and physical examination.   -Bobbi Meansas not yet had a sleep study.  -Instruct patient to complete a split night sleep study with EtCO2 to evaluate  the risk hypoventilation.    -We consider treatment as indicated when testing is complete.     # CHRONIC SLEEP MAINTENANCE INSOMNIA:  -likely due to  untreated sleep apnea, and nocturia.  -ANASTACIO: 21    # MORBID OBESITY:  -with a BMI of  51.84. Bobbi Valentin most recent Bicarbonate was 23  Bicarbonate   Date Value Ref Range Status   02/26/2024 23 21 - 32 mmol/L Final   -Encourage to have regular exercise to manage weight well.  -Refer to a nutritionist for weight control.  -Bariatric surgery candidate.    # NASAL CONGESTION:  -Instruct Bobbi Singh use appropriate Flonase spray to ease congestion.  -Refer to Otolaryngology for underlying investigation.    # XEROSTOMIA:  -Instruct Bobbi Singh purchase the Biotene gel to ease the dry mouth symptom,       RTC 2-3 weeks after sleep study       All of patient's questions were answered. She verbalizes understanding and agreement with my assessment and plan.

## 2024-05-10 NOTE — PATIENT INSTRUCTIONS
[facility]  [facility] ONE  [facility] ONE  [address] [place] [address]       Thank you for coming to the Sleep Medicine Clinic today! Your sleep medicine provider today was: SHIRA Morris Below is a summary of your treatment plan, patient education, other important information, and our contact numbers.    Dear Bobbi Valentin,    Thank you for visiting  Sleep Medicine Clinic !     1. We will proceed with an IN-CENTER (SPLIT NIGHT) sleep study to check your risk of sleep apnea. The lab will call you and schedule it for you.     2. Please do not drive when you are sleepy and  start practicing the sleep hygiene  as discussed in clinic today.     3. Please start using Biotene to ease your dry mouth if needed.    4.Your blood pressure was elevated in the office today. Please obtain a home blood pressure monitoring kit, log your blood pressure at home, and follow up with your primary care physician. To control your blood pressure better, please take anti-hypertension medication at bedtime and a water pill at waking time.    5. FOR QUESTIONS AND CONCERNS:   a) : To schedule, cancel, or reschedule SLEEP STUDY appointments, please call [phone]  B ): Please call my office with issues or questions: [phone] ([place]); [phone] ([facility]); [phone] ([facility])    If you have a CPAP or BiPAP machine at home, please bring the unit and all accessories including the power cord to your appointments unless I tell you otherwise. Please have knowledge of the DME company you worked with to receive your PAP device. If you have copies of any previous sleep testing completed outside of , please bring with you to clinic as well. This information will make our visits more productive.     If you are new to CPAP or BiPAP, please note the minimum usage insurance requires to continuing coverage for the equipment as noted by your DME company. Please  discuss equipment issues (PAP unit, mask fit, humidification, etc.) with your [employer] company first.       In the event that you are running more than 10 minutes late to your appointment, I will kindly ask you to reschedule. Thanks.      TREATMENT PLAN     Follow-up Appointment:   Follow-up in 2-3 weeks after sleep study.    PATIENT EDUCATION     OBSTRUCTIVE SLEEP APNEA (MARIS) is a sleep disorder where your upper airway muscles relax during sleep and the airway intermittently and repetitively narrows and collapses leading to partially blocked airway (hypopnea) or completely blocked airway (apnea) which, in turn, can disrupt breathing in sleep, lower oxygen levels while you sleep and cause night time wakings. Because both apnea and hypopnea may cause higher carbon dioxide or low oxygen levels, untreated MARIS can lead to heart arrhythmia, elevation of blood pressure, and make it harder for the body to consolidate memory and facilitate metabolism (leading to higher blood sugars at night). Frequent partial arousals occur during sleep resulting in sleep deprivation and daytime sleepiness. MARIS is associated with an increased risk of cardiovascular disease, stroke, hypertension, and insulin resistance. Moreover, untreated MARIS with excessive daytime sleepiness can increase the risk of motor vehicular accidents.    Below are conservative strategies for MARIS regardless of MARIS severity are:   Positional therapy - Avoid sleeping on your back.   Healthy diet and regular exercise to optimize weight is highly encouraged.   Avoid alcohol late in the evening and sedative-hypnotics as these substances can make sleep apnea worse.   Improve breathing through the nose with intranasal steroid spray, saline rinse, or antihistamines    Safety: Avoid driving vehicle and operating heavy equipment while sleepy. Drowsy driving may lead to life-threatening motor vehicle accidents. A person driving while sleepy is 5 times more likely to have an  accident. If you feel sleepy, pull over and take a short power nap (sleep for less than 30 minutes). Otherwise, ask somebody to drive you.    Treatment options for sleep apnea include weight management, positional therapy, Positive Airway Therapy (PAP) therapy, oral appliance therapy, hypoglossal nerve stimulator (Inspire) and select airway surgeries.    Sleep Testing for sleep apnea: The best and ideal way to check out if you have sleep apnea is to do an overnight sleep study in the sleep laboratory. Alternatively, a home sleep apnea test can also be done depending on your insurance and risk factors.     If you are having a home sleep apnea test, kindly allot 1 hour during pickup of the testing kit as you will have to complete paperwork and listen to the sleep technician for in-person on-the-spot demonstration and instructions on how to hook up the testing kit at home. Do the test for 1 day and start off with sleeping on your back. If you sleep on your side in the middle of night or you have always been a side or stomach-sleeper, it is ok as long as you have some time on your back and off-back.     If you are having an overnight in-lab sleep study, please make sure to bring toiletries, a comfy pillow, additional warm blankets, and any nighttime medications (include as-needed inhaler, pain pill, etc) that you may regularly take. Also, be sure to eat dinner before you arrive as we generally do not provide meals inside the sleep testing center. Lastly, in order to fall asleep faster in the sleep testing center, we advise patients to wake up 2 hours earlier on the morning of scheduled testing and avoid napping 2 days prior testing. Sometimes, your sleep provider may prescribe a sleep aid to be taken at lights out in the sleep testing center. If you are taking a sleep aid, consider having somebody pick you up after the sleep testing.    Overnight sleep studies may be scheduled on a weekday or weekend. We also perform  daytime testing for shift workers on a case-by-case basis.    Once you have booked an appointment to do the sleep study, please contact my office for follow-up visit to discuss results.    On the other hand, if you have any of the following, please consider calling the sleep testing center to RESCHEDULE your sleep study appointment:  If you tested positive for COVID within 10 days of your sleep study appointment.  If you were exposed to somebody who was confirmed for COVID within 10 days of your sleep study appointment and now you are having symptoms of possible COVID  If you have fever>100F or any acute symptoms that you think will lead to poor sleep during testing (e.g. new or worsening stuffy nose not relieved by steroid nasal spray)  If you have traveled domestically or internationally in the last month and now you are having symptoms of possible COVID    You can also go to the following EDUCATION WEBSITES for further information:   American Academy of Sleep Medicine http://sleepeducation.org  National Sleep Foundation: https://sleepfoundation.org  American Sleep Apnea Association: https://www.sleepapnea.org (for patients with sleep apnea)  Narcolepsy Network: https://www.narcolepsynetwork.org (for patients with narcolepsy)  [employer]: https://www.wakeupnarcolepsy.org (for patients with narcolepsy)  Hypersomnia Foundation: https://www.hypersomniafoundation.org (for patients with idiopathic hypersomnia)  RLS foundation: https://www.rls.org (for patients with restless leg syndrome)    IMPORTANT INFORMATION     Call 911 for medical emergencies.  Our offices are generally open from Monday-Friday, 8 am - 5 pm.   There are no supporting services by either the sleep doctors or their staff on weekends and Holidays, or after 5 PM on weekdays.   If you need to get in touch with me, you may either call my office number or you can use [employer].  If a referral for a test, for CPAP, or for another specialist was  made, and you have not heard about scheduling this within a week, please call scheduling at [phone] ([phone]).  If you are unable to make your appointment for clinic or an overnight study, kindly call the office or sleep testing center at least 48 hours in advance to cancel and reschedule.  If you are on CPAP, please bring your device's card and/or the device to each clinic appointment.   In case of problems with PAP machine or mask interface, please contact your DME (Durable Medical Equipment) company first. DME is the company who provides you the machine and/or PAP supplies.       PRESCRIPTIONS     We require 7 days advanced notice for prescription refills. If we do not receive the request in this time, we cannot guarantee that your medication will be refilled in time.    IMPORTANT PHONE NUMBERS     Sleep Medicine Clinic Fax: [phone]  Appointments (for Pediatric Sleep Clinic): [phone] - option 1  Appointments (for Adult Sleep Clinic): [phone] - option 2  Appointments (For Sleep Studies): [phone] - option 3  Behavioral Sleep Medicine: [phone]  Sleep Surgery: [phone]  Nutrition Service: [phone]  Weight management clinics with endocrinology: [phone]  Bariatric Services: [phone] (includes weight loss medications and weight loss surgery)  [facility]: [phone] (offers holistic approaches to weight management)  ENT (Otolaryngology): [phone]  Headache Clinic (Neurology): [phone]  Neurology: [phone]  Psychiatry: [phone]  Pulmonary Function Testing (PFT) Center: [phone]  Pulmonary Medicine: [phone]  [employer] ([place]): [phone]      OUR SLEEP TESTING LOCATIONS     Our team will contact you to schedule your sleep study, however, you can contact us as follow:  Main Phone Line (scheduling only): [phone]), option 3  Adult and Pediatric Locations  [facility] (6 years and older):  "[facility] - 4th floor ([address]) After hours line: [phone]  [facility] (Main campus: All ages): [facility], 6th floor. After hours line: [phone]   [place] (5 years and older; younger considered on case-by-case basis): [address]; [address], [address]. Scheduling  After hours line: [phone]   [place] (6 years and older): [address]; [address]; [address]   [place] (6 years and older): [address] [facility], [address]  After hours line: [phone]   [place] (13 years and older) in [place]: [address], 2nd floor  After hours line: [phone]   [place] (13 year and older): [address]  After hours line: [phone]     Adult Only Locations:   [place] (18 years and older): [address] [place] [address], 2nd floor   [place] (18 years and older): [address] [facility]; 4th floor  After hours line: [phone]  [facility] (18 years and older) at [place]: [address] [place] [address]  After hours line: [phone]     CONTACTING YOUR SLEEP MEDICINE PROVIDER AND SLEEP TEAM      For issues with your machine or mask interface, please call your DME provider first. DME stands for durable medical company. DME is the company who provides you the machine and/or PAP supplies / accessories.   To schedule, cancel, or reschedule SLEEP STUDY APPOINTMENTS, please call the Main Phone Line at [phone] - option 3.   To schedule, cancel, or reschedule CLINIC APPOINTMENTS, you can do it in \"MyChart\", call [phone] to speak with my  (Debbi Santiago), or call the Main Phone Line at [phone] - option 2  For CLINICAL QUESTIONS or MEDICATION REFILLS, please call direct line for Adult Sleep Nurses at [phone].   Lastly, you can also send a message directly to your provider through \"My Chart\", which is the email service through your  Records Account: " [email or web address]       Here at [facility], we wish you a restful sleep!

## 2024-05-19 DIAGNOSIS — E03.9 HYPOTHYROIDISM, UNSPECIFIED TYPE: ICD-10-CM

## 2024-05-20 ENCOUNTER — LAB (OUTPATIENT)
Dept: LAB | Facility: LAB | Age: 54
End: 2024-05-20
Payer: COMMERCIAL

## 2024-05-20 ENCOUNTER — PROCEDURE VISIT (OUTPATIENT)
Dept: OBSTETRICS AND GYNECOLOGY | Facility: CLINIC | Age: 54
End: 2024-05-20
Payer: COMMERCIAL

## 2024-05-20 VITALS
BODY MASS INDEX: 50.02 KG/M2 | DIASTOLIC BLOOD PRESSURE: 78 MMHG | SYSTOLIC BLOOD PRESSURE: 126 MMHG | HEIGHT: 64 IN | WEIGHT: 293 LBS

## 2024-05-20 DIAGNOSIS — Z32.02 ENCOUNTER FOR PREGNANCY TEST WITH RESULT NEGATIVE: ICD-10-CM

## 2024-05-20 DIAGNOSIS — N93.9 ABNORMAL UTERINE BLEEDING: ICD-10-CM

## 2024-05-20 DIAGNOSIS — Z01.818 PREOPERATIVE EXAM FOR GYNECOLOGIC SURGERY: ICD-10-CM

## 2024-05-20 DIAGNOSIS — E03.9 HYPOTHYROIDISM, UNSPECIFIED TYPE: ICD-10-CM

## 2024-05-20 DIAGNOSIS — E03.9 HYPOTHYROIDISM, UNSPECIFIED TYPE: Primary | ICD-10-CM

## 2024-05-20 DIAGNOSIS — N92.1 MENORRHAGIA WITH IRREGULAR CYCLE: ICD-10-CM

## 2024-05-20 DIAGNOSIS — D21.9 FIBROID: Primary | ICD-10-CM

## 2024-05-20 LAB
ANION GAP SERPL CALC-SCNC: 14 MMOL/L (ref 10–20)
BUN SERPL-MCNC: 9 MG/DL (ref 6–23)
CALCIUM SERPL-MCNC: 9 MG/DL (ref 8.6–10.3)
CHLORIDE SERPL-SCNC: 102 MMOL/L (ref 98–107)
CO2 SERPL-SCNC: 25 MMOL/L (ref 21–32)
CREAT SERPL-MCNC: 0.62 MG/DL (ref 0.5–1.05)
EGFRCR SERPLBLD CKD-EPI 2021: >90 ML/MIN/1.73M*2
ERYTHROCYTE [DISTWIDTH] IN BLOOD BY AUTOMATED COUNT: 13.8 % (ref 11.5–14.5)
FSH SERPL-ACNC: 3 IU/L
GLUCOSE SERPL-MCNC: 84 MG/DL (ref 74–99)
HCT VFR BLD AUTO: 39.2 % (ref 36–46)
HGB BLD-MCNC: 12.7 G/DL (ref 12–16)
LH SERPL-ACNC: 2.8 IU/L
MCH RBC QN AUTO: 28.9 PG (ref 26–34)
MCHC RBC AUTO-ENTMCNC: 32.4 G/DL (ref 32–36)
MCV RBC AUTO: 89 FL (ref 80–100)
NRBC BLD-RTO: 0 /100 WBCS (ref 0–0)
PLATELET # BLD AUTO: 282 X10*3/UL (ref 150–450)
POTASSIUM SERPL-SCNC: 4.1 MMOL/L (ref 3.5–5.3)
PREGNANCY TEST URINE, POC: NEGATIVE
RBC # BLD AUTO: 4.4 X10*6/UL (ref 4–5.2)
SODIUM SERPL-SCNC: 137 MMOL/L (ref 136–145)
T4 FREE SERPL-MCNC: 1.02 NG/DL (ref 0.61–1.12)
TSH SERPL-ACNC: 0.26 MIU/L (ref 0.44–3.98)
WBC # BLD AUTO: 9.8 X10*3/UL (ref 4.4–11.3)

## 2024-05-20 PROCEDURE — 81025 URINE PREGNANCY TEST: CPT | Performed by: OBSTETRICS & GYNECOLOGY

## 2024-05-20 PROCEDURE — 84439 ASSAY OF FREE THYROXINE: CPT

## 2024-05-20 PROCEDURE — 84443 ASSAY THYROID STIM HORMONE: CPT

## 2024-05-20 PROCEDURE — 88305 TISSUE EXAM BY PATHOLOGIST: CPT | Performed by: STUDENT IN AN ORGANIZED HEALTH CARE EDUCATION/TRAINING PROGRAM

## 2024-05-20 PROCEDURE — 88305 TISSUE EXAM BY PATHOLOGIST: CPT

## 2024-05-20 PROCEDURE — 83001 ASSAY OF GONADOTROPIN (FSH): CPT

## 2024-05-20 PROCEDURE — 58558 HYSTEROSCOPY BIOPSY: CPT | Performed by: OBSTETRICS & GYNECOLOGY

## 2024-05-20 PROCEDURE — 36415 COLL VENOUS BLD VENIPUNCTURE: CPT

## 2024-05-20 PROCEDURE — 83002 ASSAY OF GONADOTROPIN (LH): CPT

## 2024-05-20 PROCEDURE — 85027 COMPLETE CBC AUTOMATED: CPT

## 2024-05-20 PROCEDURE — 80048 BASIC METABOLIC PNL TOTAL CA: CPT

## 2024-05-20 NOTE — PROGRESS NOTES
Patient presents today for EMB hysteroscopy  Patient with menorrhagia and fibroids  Negative pregnancy test today  Questions answered and consent signed      Patient ID: Bobbi Valentin is a 53 y.o. female.    Office hysteroscopy:   Questions answered and consent signed  Speculum placed  Betadine prep cervix  Anterior lip of cervix grasped with single-tooth tenaculum  Uterus sounded to 12 cm  Hysteroscope inserted  Saline uses medium  Able to see normal cavity without any complications  Hysteroscope used remove saline via suction  Hysteroscope removed  Patient tolerated procedure well  Endometrial biopsy completed    Endometrial biopsy    Date/Time: 5/20/2024 2:06 PM    Performed by: Crissy Elise MD  Authorized by: Crissy Elise MD    Consent:     Consent obtained: written    Consent given by: patient    Risks discussed: bleeding  Indications:     Indications: abnormal uterine bleeding    Pre-procedure:     Urine pregnancy test: negative    Procedure:     Prepped with: Betadine    Cervix dilated: no      Number of passes: 1  Findings:     Cervix: normal      Specimen collected: specimen collected and sent to pathology      Patient tolerance: tolerated well, no immediate complications      Thank you for your visit.  I am glad you tolerated both procedures well.  We looked in the lining of the uterus and it appears normal  Your biopsy will return in 1 to 2 weeks.  Will follow-up thereafter to discuss your results and our plans moving forward  I am glad you tolerated the procedure well  Thank you again for your visit

## 2024-05-20 NOTE — TELEPHONE ENCOUNTER
I called and talked to pt.   I asked her to get tsh done soon. She said she could get it today. Once result is back, I will decide on refill dose.

## 2024-05-21 ENCOUNTER — APPOINTMENT (OUTPATIENT)
Dept: SLEEP MEDICINE | Facility: CLINIC | Age: 54
End: 2024-05-21
Payer: COMMERCIAL

## 2024-05-21 NOTE — RESULT ENCOUNTER NOTE
LH/FSH is not in menopausal range.  Abnormal uterine bleeding with uterine fibroids.  Diagnostic hysteroscopy with dilatation and curettage scheduled

## 2024-05-22 RX ORDER — LEVOTHYROXINE SODIUM 175 UG/1
175 TABLET ORAL DAILY
Qty: 90 TABLET | Refills: 3 | OUTPATIENT
Start: 2024-05-22 | End: 2025-05-22

## 2024-05-28 LAB
LABORATORY COMMENT REPORT: NORMAL
PATH REPORT.FINAL DX SPEC: NORMAL
PATH REPORT.GROSS SPEC: NORMAL
PATH REPORT.TOTAL CANCER: NORMAL

## 2024-05-28 PROCEDURE — RXMED WILLOW AMBULATORY MEDICATION CHARGE

## 2024-05-28 RX ORDER — LEVOTHYROXINE SODIUM 175 UG/1
175 TABLET ORAL DAILY
Qty: 90 TABLET | Refills: 3 | Status: SHIPPED | OUTPATIENT
Start: 2024-05-28 | End: 2025-05-28

## 2024-05-31 ENCOUNTER — PHARMACY VISIT (OUTPATIENT)
Dept: PHARMACY | Facility: CLINIC | Age: 54
End: 2024-05-31
Payer: COMMERCIAL

## 2024-07-03 ENCOUNTER — TELEPHONE (OUTPATIENT)
Dept: OBSTETRICS AND GYNECOLOGY | Facility: CLINIC | Age: 54
End: 2024-07-03
Payer: COMMERCIAL

## 2024-07-03 NOTE — TELEPHONE ENCOUNTER
Was calling to confirm Surgery date and Pre-op dates. My chart sent due to Voicemail no set up.    Clarissa Kim MA

## 2024-07-05 ENCOUNTER — PHARMACY VISIT (OUTPATIENT)
Dept: PHARMACY | Facility: CLINIC | Age: 54
End: 2024-07-05
Payer: COMMERCIAL

## 2024-07-05 ENCOUNTER — PATIENT MESSAGE (OUTPATIENT)
Dept: PRIMARY CARE | Facility: CLINIC | Age: 54
End: 2024-07-05
Payer: COMMERCIAL

## 2024-07-05 DIAGNOSIS — E66.01 MORBID OBESITY WITH BODY MASS INDEX OF 50.0-59.9 IN ADULT (MULTI): ICD-10-CM

## 2024-07-05 PROCEDURE — RXMED WILLOW AMBULATORY MEDICATION CHARGE

## 2024-07-07 NOTE — TELEPHONE ENCOUNTER
From: Bobbi Valentin  To: Yifan Murray  Sent: 7/5/2024 7:33 AM EDT  Subject: Please refill my Zepbound 2.5 mg/.5 ml injection    Dr. Murray-  I missed a pharmacy call when I was in the operating room and I missed being on Zepbound for the month of June. Could you please prescribe Zepbound again. The pharmacist said they need another order for me to continue on Zepbound.    Thanks,  Bobbi

## 2024-07-08 ENCOUNTER — APPOINTMENT (OUTPATIENT)
Dept: OBSTETRICS AND GYNECOLOGY | Facility: CLINIC | Age: 54
End: 2024-07-08
Payer: COMMERCIAL

## 2024-07-08 ENCOUNTER — PHARMACY VISIT (OUTPATIENT)
Dept: PHARMACY | Facility: CLINIC | Age: 54
End: 2024-07-08
Payer: COMMERCIAL

## 2024-07-08 VITALS
SYSTOLIC BLOOD PRESSURE: 134 MMHG | WEIGHT: 293 LBS | HEIGHT: 64 IN | BODY MASS INDEX: 50.02 KG/M2 | DIASTOLIC BLOOD PRESSURE: 70 MMHG

## 2024-07-08 DIAGNOSIS — D21.9 FIBROID: ICD-10-CM

## 2024-07-08 DIAGNOSIS — N92.1 MENORRHAGIA WITH IRREGULAR CYCLE: Primary | ICD-10-CM

## 2024-07-08 DIAGNOSIS — N93.9 ABNORMAL UTERINE BLEEDING: ICD-10-CM

## 2024-07-08 PROCEDURE — 99213 OFFICE O/P EST LOW 20 MIN: CPT | Performed by: OBSTETRICS & GYNECOLOGY

## 2024-07-08 PROCEDURE — RXMED WILLOW AMBULATORY MEDICATION CHARGE

## 2024-07-08 PROCEDURE — 3008F BODY MASS INDEX DOCD: CPT | Performed by: OBSTETRICS & GYNECOLOGY

## 2024-07-08 NOTE — PROGRESS NOTES
"Subjective   Patient ID: Bbobi Valentin is a 53 y.o. female who presents for Follow-up (Hysterectomy scheduled for 08/07/2024).    Patient presents as a follow-up    EMB hysteroscopy within normal limits  Scheduled for hysterectomy in August  Patient with fibroids and menorrhagia  Patient states she has not had a period since May  Is not having as much trouble with urinating at night  Would like to postpone her surgery and think about having a hysterectomy  Is having hot flashes and night sweats          ROS  All Normal Review of Systems  Constitutional: no fever, no chills, no recent weight gain, no recent weight loss and no fatigue.    Gastrointestinal: no abdominal pain, no constipation, no nausea, no diarrhea and no vomiting.    Genitourinary: no dysuria, no urinary incontinence, no vaginal dryness, no vaginal itching, no dyspareunia, no pelvic pain, no dysmenorrhea, no sexual problems, no change in urinary frequency, no vaginal discharge, no unexplained vaginal bleeding and no lesion/sore.    Breasts: No masses.  No nipple discharge.  No redness    Objective   /70   Ht 1.626 m (5' 4\")   Wt 137 kg (302 lb)   LMP 07/25/2022   BMI 51.84 kg/m²    Physical Exam  General: No distress.  Alert and oriented  Abdomen: Soft, nontender, nondistended  External Genitalia:  no masses.  no erythema.  no discharge noted.  Vagina:  no masses.  no discharge noted.    Cervix: no masses.    Uterus:  normal size and contour.  no masses. palpated  Adnexa: R: no masses, non tender.    Adnexa: L: no masses.  non tender  Extremities: No swelling  Psych: No sadness.      Assessment/Plan   1) fibroids and menorrhagia  Has not had a cycle since May  Would like to postpone her surgery until January  EMB within normal limits  Will move her surgery to January 22.  Will move her preop visits as well      Thank you for your visit to our office today.     "

## 2024-07-22 ENCOUNTER — APPOINTMENT (OUTPATIENT)
Dept: PHARMACY | Facility: HOSPITAL | Age: 54
End: 2024-07-22
Payer: COMMERCIAL

## 2024-07-22 DIAGNOSIS — E66.01 MORBID OBESITY WITH BODY MASS INDEX OF 50.0-59.9 IN ADULT (MULTI): ICD-10-CM

## 2024-07-22 NOTE — Clinical Note
Yariel Murray, Just wanted to inform you that patient does not have a follow up scheduled with you yet! Last visit with you was on 3/7/24.  Thank you!

## 2024-07-22 NOTE — PROGRESS NOTES
Patient ID: Bobbi Valentin is a 53 y.o. female who presents for Obesity.    Referring Provider: Yifan Murray MD  PCP: Yifan Murray MD Last visit with PCP: 3/27/24 Next visit with PCP: Not Currently Scheduled       Subjective     DISCUSSION  Patient is on her second month of Zepbound 2.5 mg   Patient tolerated the first month very well; denied side effects  Patient is currently experiencing frequent belching, slight constipation, and nausea  Patient reports she may be eating more this month  Advised patient to control portion sizes and assess if symptoms resolve  Also advised to reduce consumption of greasy/fried foods to help with nausea   Patient has 2 more doses left of current supply  Reviewed side effects, dosing schedule and administration   Patient has no further questions or concerns       Objective     LMP 07/25/2022    BP Readings from Last 4 Encounters:   07/08/24 134/70   05/20/24 126/78   05/10/24 137/80   04/22/24 127/74      There were no vitals filed for this visit.     Labs  Lab Results   Component Value Date    BILITOT 0.4 02/26/2024    CALCIUM 9.0 05/20/2024    CO2 25 05/20/2024     05/20/2024    CREATININE 0.62 05/20/2024    GLUCOSE 84 05/20/2024    ALKPHOS 83 02/26/2024    K 4.1 05/20/2024    PROT 6.8 02/26/2024     05/20/2024    AST 22 02/26/2024    ALT 22 02/26/2024    BUN 9 05/20/2024    ANIONGAP 14 05/20/2024    ALBUMIN 4.4 02/26/2024    GFRF >90 05/04/2022     Lab Results   Component Value Date    TRIG 128 02/26/2024    CHOL 160 02/26/2024    LDLCALC 89 02/26/2024    HDL 45.5 02/26/2024     Lab Results   Component Value Date    HGBA1C 5.2 02/26/2024       Current Outpatient Medications   Medication Instructions    levothyroxine (SYNTHROID, LEVOXYL) 175 mcg, oral, Daily    multivitamin (Daily Multi-Vitamin) tablet oral, Daily RT    Zepbound 2.5 mg, subcutaneous, Every 7 days       Assessment/Plan   CONTINUE Zepbound 2.5 mg weekly  Watch portion sizes of meals and avoid  over-snacking at night    Follow-up: 7/31 to assess symptom management      Time spent with pt: Total length of time 15 (minutes) of the encounter and more than 50% was spent counseling the patient.    Atiya Dugan, PharmD    Continue all meds under the continuation of care with the referring provider and clinical pharmacy team.

## 2024-07-25 ENCOUNTER — APPOINTMENT (OUTPATIENT)
Dept: OBSTETRICS AND GYNECOLOGY | Facility: CLINIC | Age: 54
End: 2024-07-25
Payer: COMMERCIAL

## 2024-07-31 ENCOUNTER — APPOINTMENT (OUTPATIENT)
Dept: PHARMACY | Facility: HOSPITAL | Age: 54
End: 2024-07-31
Payer: COMMERCIAL

## 2024-07-31 DIAGNOSIS — E66.01 MORBID OBESITY WITH BODY MASS INDEX OF 50.0-59.9 IN ADULT (MULTI): ICD-10-CM

## 2024-08-01 NOTE — PROGRESS NOTES
Patient ID: Bobbi Valentin is a 53 y.o. female who presents for Obesity.    Referring Provider: Yifan Murray MD  PCP: Yifan Murray MD Last visit with PCP: 3/27/24 Next visit with PCP: Not Currently Scheduled       Subjective     DISCUSSION  Patient has finished her second month of Zepbound 2.5 mg and continues to have side effects  Patient has not slept well in the past month due to feeling chest pain that is only resolved belching   Patient also endorses nausea   Due to lack of sleep, patient has also noticed her BP has risen with now systolic readings in the 130s  Patient has a sleep study on 8/19  Patient would like to hold this medication for the next month and reconsider restarting after sleep study is completed    Objective     LMP 07/25/2022    BP Readings from Last 4 Encounters:   07/08/24 134/70   05/20/24 126/78   05/10/24 137/80   04/22/24 127/74      There were no vitals filed for this visit.     Labs  Lab Results   Component Value Date    BILITOT 0.4 02/26/2024    CALCIUM 9.0 05/20/2024    CO2 25 05/20/2024     05/20/2024    CREATININE 0.62 05/20/2024    GLUCOSE 84 05/20/2024    ALKPHOS 83 02/26/2024    K 4.1 05/20/2024    PROT 6.8 02/26/2024     05/20/2024    AST 22 02/26/2024    ALT 22 02/26/2024    BUN 9 05/20/2024    ANIONGAP 14 05/20/2024    ALBUMIN 4.4 02/26/2024    GFRF >90 05/04/2022     Lab Results   Component Value Date    TRIG 128 02/26/2024    CHOL 160 02/26/2024    LDLCALC 89 02/26/2024    HDL 45.5 02/26/2024     Lab Results   Component Value Date    HGBA1C 5.2 02/26/2024       Current Outpatient Medications   Medication Instructions    levothyroxine (SYNTHROID, LEVOXYL) 175 mcg, oral, Daily    multivitamin (Daily Multi-Vitamin) tablet oral, Daily RT    Zepbound 2.5 mg, subcutaneous, Every 7 days       Assessment/Plan   DISCONTINUE Zepbound 2.5 mg at this time and monitor if all side effects are resolved       Follow-up: 8/28 after sleep study      Time spent with pt:  Total length of time 15 (minutes) of the encounter and more than 50% was spent counseling the patient.    Atiya Dugan, PharmD    Continue all meds under the continuation of care with the referring provider and clinical pharmacy team.

## 2024-08-18 PROCEDURE — RXMED WILLOW AMBULATORY MEDICATION CHARGE

## 2024-08-19 ENCOUNTER — PROCEDURE VISIT (OUTPATIENT)
Dept: SLEEP MEDICINE | Facility: CLINIC | Age: 54
End: 2024-08-19
Payer: COMMERCIAL

## 2024-08-19 ENCOUNTER — APPOINTMENT (OUTPATIENT)
Dept: SLEEP MEDICINE | Facility: CLINIC | Age: 54
End: 2024-08-19
Payer: COMMERCIAL

## 2024-08-19 ENCOUNTER — PHARMACY VISIT (OUTPATIENT)
Dept: PHARMACY | Facility: CLINIC | Age: 54
End: 2024-08-19
Payer: COMMERCIAL

## 2024-08-19 DIAGNOSIS — E03.9 HYPOTHYROIDISM, UNSPECIFIED TYPE: ICD-10-CM

## 2024-08-19 DIAGNOSIS — G47.30 SLEEP-RELATED BREATHING DISORDER: ICD-10-CM

## 2024-08-19 RX ORDER — LEVOTHYROXINE SODIUM 175 UG/1
175 TABLET ORAL DAILY
Qty: 90 TABLET | Refills: 3 | Status: SHIPPED | OUTPATIENT
Start: 2024-08-19 | End: 2025-08-19

## 2024-08-20 ENCOUNTER — APPOINTMENT (OUTPATIENT)
Dept: OBSTETRICS AND GYNECOLOGY | Facility: CLINIC | Age: 54
End: 2024-08-20
Payer: COMMERCIAL

## 2024-08-20 VITALS
HEIGHT: 64 IN | WEIGHT: 293 LBS | OXYGEN SATURATION: 100 % | RESPIRATION RATE: 10 BRPM | BODY MASS INDEX: 50.02 KG/M2 | HEART RATE: 69 BPM | DIASTOLIC BLOOD PRESSURE: 70 MMHG | SYSTOLIC BLOOD PRESSURE: 134 MMHG

## 2024-08-20 ASSESSMENT — SLEEP AND FATIGUE QUESTIONNAIRES
SITING INACTIVE IN A PUBLIC PLACE LIKE A CLASS ROOM OR A MOVIE THEATER: SLIGHT CHANCE OF DOZING
HOW LIKELY ARE YOU TO NOD OFF OR FALL ASLEEP IN A CAR, WHILE STOPPED FOR A FEW MINUTES IN TRAFFIC: SLIGHT CHANCE OF DOZING
HOW LIKELY ARE YOU TO NOD OFF OR FALL ASLEEP WHEN YOU ARE A PASSENGER IN A CAR FOR AN HOUR WITHOUT A BREAK: HIGH CHANCE OF DOZING
HOW LIKELY ARE YOU TO NOD OFF OR FALL ASLEEP WHILE WATCHING TV: HIGH CHANCE OF DOZING
HOW LIKELY ARE YOU TO NOD OFF OR FALL ASLEEP WHILE SITTING QUIETLY AFTER LUNCH WITHOUT ALCOHOL: WOULD NEVER DOZE
HOW LIKELY ARE YOU TO NOD OFF OR FALL ASLEEP WHILE SITTING AND READING: MODERATE CHANCE OF DOZING
HOW LIKELY ARE YOU TO NOD OFF OR FALL ASLEEP WHILE LYING DOWN TO REST IN THE AFTERNOON WHEN CIRCUMSTANCES PERMIT: HIGH CHANCE OF DOZING
ESS-CHAD TOTAL SCORE: 14
HOW LIKELY ARE YOU TO NOD OFF OR FALL ASLEEP WHILE SITTING AND TALKING TO SOMEONE: SLIGHT CHANCE OF DOZING

## 2024-08-20 NOTE — PROGRESS NOTES
[facility] TECH NOTE:     Patient: Bobbi Valentin   MRN//AGE: [id number]  1970  53 y.o.   Technologist: Rolf Verma   Room: 3   Service Date: 2024        Sleep Testing Location: [facility]  Neck Cikr 46 cm  [place]: 14    TECHNOLOGIST SLEEP STUDY PROCEDURE NOTE:   This sleep study is being conducted according to the policies and procedures outlined by the [employer] accreditation standards.  The sleep study procedure and processes involved during this appointment was explained to the patient/patient’s family, questions were answered. The patient/family verbalized understanding.      The patient is a 53 y.o. year old female scheduled for aDiagnostic PSG Split night with end tidal (ETCO2) and Transcutaneous (TCCO2) CO2 monitoring    The study that was ultimately completed was aDiagnostic PSG Split night with end tidal (ETCO2) and Transcutaneous (TCCO2) CO2 monitoring    The full study Was completed.  Patient questionnaires completed?: yes   Consents signed? yes      Initial Fall Risk Screening:     Bobbi has not fallen in the last 6 months. Bobbi does not have a fear of falling. He does not need assistance with sitting, standing, or walking. she does not need assistance walking in her home. she does not need assistance in an unfamiliar setting. The patient is notusing an assistive device.     Brief Study observations: This is a 53 year old female here for a PSG-Split Night with end tidal (ETCO2) and Transcutaneous (TCCO2) CO2 monitoring Study. CPAP was initiated at 0018..  The patient did meet split-night criteria AHI > 15 in at least two hours of sleep prior to 0300. The CPAP mask was changed to a size extra small    Deviation to order/protocol and reason: None      If PAP, which was preferred mask/pressure/mode: Naif Blakely full face mask size extra small    Other:None    After the procedure, the patient/family was informed to ensure followup with ordering clinician for testing results.       Technologist: Rolf Verma

## 2024-08-28 ENCOUNTER — APPOINTMENT (OUTPATIENT)
Dept: PHARMACY | Facility: HOSPITAL | Age: 54
End: 2024-08-28
Payer: COMMERCIAL

## 2024-08-28 DIAGNOSIS — E66.01 MORBID OBESITY WITH BODY MASS INDEX OF 50.0-59.9 IN ADULT (MULTI): ICD-10-CM

## 2024-08-28 NOTE — PROGRESS NOTES
Patient ID: Bobbi Valentin is a 53 y.o. female who presents for Obesity. At last visit, Zepbound was discontinued due to side effects.    Referring Provider: Yifan Murray MD  PCP: Yifan Murray MD Last visit with PCP: 3/27/24 Next visit with PCP: Not Currently Scheduled       Subjective     DISCUSSION  Since discontinuing Zepbound, all side effects have resolved.  Patient no longer feels chest pain, belching, or nausea   As a result, patient is sleeping better these nights   Patient's BP has stabilized as well  Latest readin/60  Patient has been intermittent fasting which she had found success in before  Encouraged patient to continue focusing on lifestyle modifications to help with weight loss   Patient has no further questions or concerns       Objective     LMP 2022    BP Readings from Last 4 Encounters:   24 134/70   24 134/70   24 126/78   05/10/24 137/80      There were no vitals filed for this visit.     Labs  Lab Results   Component Value Date    BILITOT 0.4 2024    CALCIUM 9.0 2024    CO2 25 2024     2024    CREATININE 0.62 2024    GLUCOSE 84 2024    ALKPHOS 83 2024    K 4.1 2024    PROT 6.8 2024     2024    AST 22 2024    ALT 22 2024    BUN 9 2024    ANIONGAP 14 2024    ALBUMIN 4.4 2024    GFRF >90 2022     Lab Results   Component Value Date    TRIG 128 2024    CHOL 160 2024    LDLCALC 89 2024    HDL 45.5 2024     Lab Results   Component Value Date    HGBA1C 5.2 2024       Current Outpatient Medications   Medication Instructions    levothyroxine (SYNTHROID, LEVOXYL) 175 mcg, oral, Daily    multivitamin (Daily Multi-Vitamin) tablet oral, Daily RT    Zepbound 2.5 mg, subcutaneous, Every 7 days       Assessment/Plan   CONTINUE lifestyle modifications such as intermittent fasting and incorporating more exercise into daily routine to  help with weight loss. Due to side effects, will not restart on Zepbound at this time.      Follow-up: as needed     Time spent with pt: Total length of time 15 (minutes) of the encounter and more than 50% was spent counseling the patient.    Atiya Dugan, PharmD    Continue all meds under the continuation of care with the referring provider and clinical pharmacy team.

## 2024-09-18 NOTE — PROGRESS NOTES
[facility]  [facility]  [facility]  [address] [place] [address]     [facility]  Follow Up Visit Note    PAP Adherence:Virtual or Telephone Consent    {Complete for a virtual or telephone visit:96403}  {Select who provided consent:30210}            Subjective  Patient ID: Bobbi Valentin is a 53 y.o. female with past medical history significant for Morbid Obesity, sinuitis, and Sleep disorder breathing.    9/27/24: UPDATED : The patient returned to the clinic to review her sleep study to treat her sleep apnea. The desensitization strategy, 30-day free mask return policy, and insurance requirements are all discussed with the patient. The patient verbalized understanding it.      5/10/2024: The patient is here alone today and was referred by PCP Yifan Murray MD, for comprehensive sleep medicine evaluation due to suspected sleep apnea, excessive daytime sleepiness/fatigue, and difficulty falling/staying asleep. She reported having COVID 2 years ago, and after that, she woke up more often at night. She also mentions that she is close to menopause and has uterine polyps, which makes her more anxious. She feels exhausted and doeszes during the day. Her  is aware that she is snoring and gasping at night sometimes. Today, she came here to establish care. Her  ESS is 10, ANASTACIO is 21, and her neck circumference is 18.75 inches today.     HPI  The patient had been having these symptoms for the past 2 years. The patient never had sleep study done yet.         SLEEP STUDY HISTORY: (personally reviewed raw data such as interpretation report, data sheet, hypnogram, and titration table if available and applicable)  N/A     SLEEP-WAKE SCHEDULE  Bedtime: 10 PM on weekdays, 10-11 PM on weekends  Subjective sleep latency: 30 minutes  Problems falling asleep: No  Number of awakenings: 2-3 times per night spontaneously for  urination  Falls back asleep in 20 minutes  Problems staying asleep: Yes  Final wake time: 5 AM on weekdays, 5-8 AM on weekends  Shift work: No  Naps: Yes  Feels rested after a nap: No   Average sleep duration (excluding naps): 5-6 hours     SLEEP ENVIRONMENT  Sleep location: bed  Sleep status: sleeps with   Room is dark:  Yes  Room is quiet: Yes  Room is cool: Yes  Bed comfort: good     SLEEP HABITS:   Activities before bedtime: use phone for reading  Activities in bed: no  Preferred sleep position: back     SLEEP ROS:  Night symptoms: POSITIVE for snoring, wake up gasping and/or choking for air, nasal congestion , mouth breathing, night sweats during sleep, heartburn or sour taste in mouth at night, nocturnal cough, and nocturia  Morning symptoms: POSITIVE for unrefreshing sleep and morning dry mouth  Daytime symptoms POSITIVE for excessive daytime sleepiness, fatigue, trouble remembering things in daytime, and trouble staying focused in daytime  Hypersomnia / narcolepsy symptoms: Patient denies symptoms of a hypersomnolence disorder such as sleep paralysis, sleep-related hallucinations, recurrent sleep attacks, automatic behaviors, and cataplexy.   RLS symptoms: Patient denies RLS symptoms.  Movements in sleep: Patient denies problematic movements in sleep such as seizures during sleep, frequent leg kicks / jerks while asleep, and sleep-related bruxism.  Parasomnia symptoms: Patient denies symptoms of parasomnia such as sleepwalking, sleeptalking, sleep-eating, acting out dreams, and nightmares.      WEIGHT: gained 20 lbs in 6 months      REVIEW OF SYSTEMS: All other systems have been reviewed and are negative.     PERTINENT SOCIAL HISTORY:  Occupation: perfusionist  Smoking: No   ETOH: No   Marijuana: No   Caffeine: Yes  Sleep aids: No   Claustrophobia: No      PERTINENT PAST SURGICAL HISTORY:  non-contributory     PERTINENT FAMILY HISTORY:  sleep apnea- father, brother, mother     Active Problems,  Allergy List, Medication List, and PMH/PSH/FH/Social Hx have been reviewed and reconciled in chart. No significant changes unless documented in the pertinent chart section. Updates made when necessary.               Objective        Vitals:     05/10/24 1100   BP: 137/80   Pulse: 82   Resp: 18   SpO2: 96%      Physical Exam  Constitutional:alert and oriented to time, place, and person  Sinus: - tenderness to palpation  Palate:  Narrow Mallampati 3, - Tongue scalloping, - macroglossia  Lungs: Clear to auscultation bilateral, no rales  Heart: Regular rate and rhythm, no murmurs           Assessment/Plan  Bobbi Valentin is a 53 y.o. female who is seen to evaluate for possible obstructive sleep apnea. The pathophysiology of sleep apnea, diagnostic testing (HST vs PSG), treatment options (PAP, oral appliance, surgery, hypoglossal nerve stimulator called Inspire), and supportive management (weight loss, positional therapy, smoking cessation, avoidance of alcohol and sedatives) were discussed with the patient in detail. Risk factors of sleep apnea as well as cardiometabolic and neurocognitive sequelae associated with untreated sleep apnea were also discussed. Lastly, patient was advised to avoid driving vehicle or operating heavy machinery when sleepy.      Bobbi Valentin with the following problems:     # SLEEP DISORDERED BREATHING:  -This is likely sleep apnea based on the the history and physical examination.   -Bobbi Clarkchris not yet had a sleep study.  -Instruct patient to complete a split night sleep study with EtCO2 to evaluate  the risk hypoventilation.    -We consider treatment as indicated when testing is complete.     # CHRONIC SLEEP MAINTENANCE INSOMNIA:  -likely due to  untreated sleep apnea, and nocturia.  -ANASTACIO: 21     # MORBID OBESITY:  -with a BMI of 51.84. Bobbi Valentin most recent Bicarbonate was 23        Bicarbonate   Date Value Ref Range Status   02/26/2024 23 21 - 32 mmol/L Final    -Encourage to have regular exercise to manage weight well.  -Refer to a nutritionist for weight control.  -Bariatric surgery candidate.     # NASAL CONGESTION:  -Instruct Bobbi Singh use appropriate Flonase spray to ease congestion.  -Refer to Otolaryngology for underlying investigation.     # XEROSTOMIA:  -Instruct Bobbi Singh purchase the Biotene gel to ease the dry mouth symptom,        RTC 1 month after receiving CPAP         All of patient's questions were answered. She verbalizes understanding and agreement with my assessment and plan.

## 2024-09-27 ENCOUNTER — APPOINTMENT (OUTPATIENT)
Dept: PRIMARY CARE | Facility: CLINIC | Age: 54
End: 2024-09-27
Payer: COMMERCIAL

## 2024-09-27 ENCOUNTER — APPOINTMENT (OUTPATIENT)
Dept: SLEEP MEDICINE | Facility: CLINIC | Age: 54
End: 2024-09-27
Payer: COMMERCIAL

## 2024-09-30 DIAGNOSIS — J20.9 ACUTE BRONCHITIS, UNSPECIFIED ORGANISM: Primary | ICD-10-CM

## 2024-09-30 RX ORDER — AZITHROMYCIN 250 MG/1
TABLET, FILM COATED ORAL
Qty: 6 TABLET | Refills: 0 | Status: SHIPPED | OUTPATIENT
Start: 2024-09-30 | End: 2024-09-30 | Stop reason: SDUPTHER

## 2024-09-30 RX ORDER — AZITHROMYCIN 250 MG/1
TABLET, FILM COATED ORAL
Qty: 6 TABLET | Refills: 0 | Status: SHIPPED | OUTPATIENT
Start: 2024-09-30 | End: 2024-10-04

## 2024-09-30 NOTE — TELEPHONE ENCOUNTER
PT STATES THAT SHE HAVE A BAD COUGH. HER CHILDREN HAVE WALKING PNEUMONIA AND WAS CURED WITH A Z PAC. SHE WOULD LIKE TO KNOW IF YOU COULD SEND HER A Z PAC

## 2024-09-30 NOTE — TELEPHONE ENCOUNTER
Okay, I sent rx for zithromax, but if she is not feeling better in next week, should be seen by someone

## 2024-12-16 DIAGNOSIS — Z01.818 ENCOUNTER FOR PREOPERATIVE ASSESSMENT: Primary | ICD-10-CM

## 2024-12-20 PROCEDURE — RXMED WILLOW AMBULATORY MEDICATION CHARGE

## 2024-12-30 ENCOUNTER — TELEPHONE (OUTPATIENT)
Dept: OBSTETRICS AND GYNECOLOGY | Facility: CLINIC | Age: 54
End: 2024-12-30
Payer: COMMERCIAL

## 2024-12-30 NOTE — TELEPHONE ENCOUNTER
Good afternoon,  The [facility] Pre-Admission Testing department has been unable to reach the patient mentioned above to schedule their appointment prior to surgery on 1/22/2025. Attempted calls were made on 12/18/2024, 12/26/2024, 12/27/2024 and 12/30/2024.  Please have the patient call [phone] to schedule their pre-admission testing appointment as soon as possible.  Thank you for your assistance in this matter

## 2024-12-31 ENCOUNTER — TELEPHONE (OUTPATIENT)
Dept: OBSTETRICS AND GYNECOLOGY | Facility: CLINIC | Age: 54
End: 2024-12-31
Payer: COMMERCIAL

## 2024-12-31 NOTE — TELEPHONE ENCOUNTER
Patient called in and is wanting to cancel January 22 surgery.Due to improvement in bleeding.    Liana Israel CNA

## 2025-01-02 ENCOUNTER — PHARMACY VISIT (OUTPATIENT)
Dept: PHARMACY | Facility: CLINIC | Age: 55
End: 2025-01-02
Payer: COMMERCIAL

## 2025-01-09 ENCOUNTER — APPOINTMENT (OUTPATIENT)
Dept: OBSTETRICS AND GYNECOLOGY | Facility: CLINIC | Age: 55
End: 2025-01-09
Payer: COMMERCIAL

## 2025-02-04 ENCOUNTER — APPOINTMENT (OUTPATIENT)
Dept: OBSTETRICS AND GYNECOLOGY | Facility: CLINIC | Age: 55
End: 2025-02-04
Payer: COMMERCIAL

## 2025-03-21 ENCOUNTER — OFFICE VISIT (OUTPATIENT)
Dept: PRIMARY CARE | Facility: CLINIC | Age: 55
End: 2025-03-21
Payer: COMMERCIAL

## 2025-03-21 VITALS
OXYGEN SATURATION: 97 % | RESPIRATION RATE: 12 BRPM | DIASTOLIC BLOOD PRESSURE: 76 MMHG | WEIGHT: 293 LBS | HEART RATE: 74 BPM | SYSTOLIC BLOOD PRESSURE: 118 MMHG | HEIGHT: 64 IN | BODY MASS INDEX: 50.02 KG/M2 | TEMPERATURE: 96.9 F

## 2025-03-21 DIAGNOSIS — J98.8 RESPIRATORY INFECTION: Primary | ICD-10-CM

## 2025-03-21 PROCEDURE — 3008F BODY MASS INDEX DOCD: CPT | Performed by: FAMILY MEDICINE

## 2025-03-21 PROCEDURE — 1036F TOBACCO NON-USER: CPT | Performed by: FAMILY MEDICINE

## 2025-03-21 PROCEDURE — 99214 OFFICE O/P EST MOD 30 MIN: CPT | Performed by: FAMILY MEDICINE

## 2025-03-21 RX ORDER — DOXYCYCLINE 100 MG/1
100 CAPSULE ORAL 2 TIMES DAILY
Qty: 14 CAPSULE | Refills: 0 | Status: SHIPPED | OUTPATIENT
Start: 2025-03-21 | End: 2025-03-28

## 2025-03-21 RX ORDER — BENZONATATE 200 MG/1
200 CAPSULE ORAL 3 TIMES DAILY PRN
Qty: 30 CAPSULE | Refills: 0 | Status: SHIPPED | OUTPATIENT
Start: 2025-03-21

## 2025-03-21 NOTE — PROGRESS NOTES
"Subjective   Patient ID: Bobbi Valentin is a 54 y.o. female who presents for Cough.    HPI  PCP: Dr. Murray    Reports dry cough x 2.5 wks.  Gets SOB if she coughs excessively.  Also gets occ sore throat.  No rhinorrhea, nasal congestion, post nasal gtt, sinus pressure, fever, chills, loss of smell or taste, nausea, vomiting, diarrhea, headaches, body aches.  Tried Motrin, Cough syrup, Mucinex, Cough drops.  Did not take home covid test.     Review of Systems  No other complaints.     Objective   /76   Pulse 74   Temp 36.1 °C (96.9 °F) (Temporal)   Resp 12   Ht 1.626 m (5' 4\")   Wt 138 kg (305 lb)   LMP 07/25/2022   SpO2 97%   BMI 52.35 kg/m²     Physical Exam  Constitutional:       General: She is not in acute distress.     Appearance: She is morbidly obese.   HENT:      Right Ear: Tympanic membrane normal.      Left Ear: Tympanic membrane normal.      Nose:      Right Sinus: No maxillary sinus tenderness or frontal sinus tenderness.      Left Sinus: No maxillary sinus tenderness or frontal sinus tenderness.      Mouth/Throat:      Pharynx: Oropharynx is clear. No oropharyngeal exudate or posterior oropharyngeal erythema.   Eyes:      Conjunctiva/sclera: Conjunctivae normal.   Cardiovascular:      Rate and Rhythm: Normal rate and regular rhythm.      Heart sounds: Normal heart sounds. No murmur heard.     No friction rub. No gallop.   Pulmonary:      Effort: Pulmonary effort is normal.      Breath sounds: Normal breath sounds. No wheezing, rhonchi or rales.   Lymphadenopathy:      Cervical: No cervical adenopathy.   Neurological:      Mental Status: She is oriented to person, place, and time.   Psychiatric:         Mood and Affect: Mood normal.         Behavior: Behavior normal.     Assessment/Plan   Diagnoses and all orders for this visit:  Respiratory infection  -     doxycycline (Vibramycin) 100 mg capsule; Take 1 capsule (100 mg) by mouth 2 times a day for 7 days. Take with at least 8 ounces " (large glass) of water, do not lie down for 30 minutes after  -     benzonatate (Tessalon) 200 mg capsule; Take 1 capsule (200 mg) by mouth 3 times a day as needed for cough. Do not crush or chew.    Antibiotic provided.  Tessalon as needed for cough.  Recommend supportive, symptomatic therapies.  Follow up with PCP if these symptoms worsen or fail to improve as anticipated.      F/U w/PCP.

## 2025-03-21 NOTE — PATIENT INSTRUCTIONS
Antibiotic provided.  Tessalon as needed for cough.  Recommend supportive, symptomatic therapies.  Follow up with PCP if these symptoms worsen or fail to improve as anticipated.      F/U w/PCP.    Biopsy Type: H and E

## 2025-03-21 NOTE — PROGRESS NOTES
"Subjective   Patient ID: Bobbi Valentin is a 54 y.o. female who presents for Cough.    HPI     Review of Systems    Objective   /76   Pulse 74   Temp 36.1 °C (96.9 °F) (Temporal)   Resp 12   Ht 1.626 m (5' 4\")   Wt 138 kg (305 lb)   LMP 07/25/2022   SpO2 97%   BMI 52.35 kg/m²     Physical Exam    Assessment/Plan          "

## 2025-03-26 PROCEDURE — RXMED WILLOW AMBULATORY MEDICATION CHARGE

## 2025-04-03 ENCOUNTER — PHARMACY VISIT (OUTPATIENT)
Dept: PHARMACY | Facility: CLINIC | Age: 55
End: 2025-04-03
Payer: COMMERCIAL

## 2025-06-25 PROCEDURE — RXMED WILLOW AMBULATORY MEDICATION CHARGE

## 2025-06-30 ENCOUNTER — PHARMACY VISIT (OUTPATIENT)
Dept: PHARMACY | Facility: CLINIC | Age: 55
End: 2025-06-30
Payer: COMMERCIAL